# Patient Record
Sex: MALE | Race: WHITE | NOT HISPANIC OR LATINO | Employment: PART TIME | URBAN - METROPOLITAN AREA
[De-identification: names, ages, dates, MRNs, and addresses within clinical notes are randomized per-mention and may not be internally consistent; named-entity substitution may affect disease eponyms.]

---

## 2017-01-19 ENCOUNTER — OFFICE VISIT (OUTPATIENT)
Dept: CARDIOLOGY | Facility: MEDICAL CENTER | Age: 77
End: 2017-01-19
Payer: MEDICARE

## 2017-01-19 VITALS
HEART RATE: 76 BPM | WEIGHT: 243 LBS | DIASTOLIC BLOOD PRESSURE: 70 MMHG | SYSTOLIC BLOOD PRESSURE: 108 MMHG | BODY MASS INDEX: 44.72 KG/M2 | OXYGEN SATURATION: 93 % | HEIGHT: 62 IN

## 2017-01-19 DIAGNOSIS — E78.2 MIXED HYPERLIPIDEMIA: ICD-10-CM

## 2017-01-19 DIAGNOSIS — I10 ESSENTIAL HYPERTENSION, BENIGN: ICD-10-CM

## 2017-01-19 DIAGNOSIS — M19.90 ARTHRITIS: ICD-10-CM

## 2017-01-19 DIAGNOSIS — N40.1 BENIGN NON-NODULAR PROSTATIC HYPERPLASIA WITH LOWER URINARY TRACT SYMPTOMS: ICD-10-CM

## 2017-01-19 PROCEDURE — 99213 OFFICE O/P EST LOW 20 MIN: CPT | Performed by: INTERNAL MEDICINE

## 2017-01-19 RX ORDER — AMLODIPINE BESYLATE 5 MG/1
5 TABLET ORAL DAILY
Qty: 90 TAB | Refills: 3 | Status: SHIPPED | OUTPATIENT
Start: 2017-01-19 | End: 2017-08-31

## 2017-01-19 RX ORDER — TADALAFIL 5 MG/1
5 TABLET ORAL PRN
Qty: 90 TAB | Refills: 3 | Status: SHIPPED | OUTPATIENT
Start: 2017-01-19

## 2017-01-19 RX ORDER — VALSARTAN AND HYDROCHLOROTHIAZIDE 320; 25 MG/1; MG/1
1 TABLET, FILM COATED ORAL DAILY
Qty: 90 TAB | Refills: 3 | Status: SHIPPED | OUTPATIENT
Start: 2017-01-19 | End: 2017-08-31

## 2017-01-19 RX ORDER — METOPROLOL SUCCINATE 50 MG/1
50 TABLET, EXTENDED RELEASE ORAL DAILY
Qty: 90 TAB | Refills: 3 | Status: SHIPPED | OUTPATIENT
Start: 2017-01-19 | End: 2017-11-06 | Stop reason: SDUPTHER

## 2017-01-19 ASSESSMENT — ENCOUNTER SYMPTOMS
PND: 0
ORTHOPNEA: 0
MYALGIAS: 0
FALLS: 0
WHEEZING: 0
COUGH: 0

## 2017-01-19 ASSESSMENT — LIFESTYLE VARIABLES: SUBSTANCE_ABUSE: 0

## 2017-01-19 NOTE — Clinical Note
[facility]   [address]  [place] [address]  Phone: [phone]  Fax: [phone]              Kendrick Schilling  1940    Encounter Date: 1/19/2017    Levy Martinez M.D.          PROGRESS NOTE:  Subjective:   Kendrick Schilling is a 76 y.o. male who presents today for follow-up of hypertension and hyperlipidemia. His right hip had to be injected last week but otherwise he's had no symptoms and has had actually no chest discomfort or shortness of breath or arrhythmia.    Past Medical History   Diagnosis Date   • Hyperlipidemia 2/16/2010   • DM (diabetes mellitus), type 2, uncontrolled 2/16/2010   • Hypertension    • Vascular calcification 2/11/2016     Noted incidentally on orthopedic plane films of the hips    • Sleep apnea, obstructive 2/11/2016     Uses CPAP and is regularly followed by Dr. Palmer; unable to estimate right ventricular systolic pressure on echo 2016 because of absent tricuspid regurgitation but no indirect evidence of pulmonary hypertension   • Arthritis of right hip      Past Surgical History   Procedure Laterality Date   • Knee replacement, total Bilateral    • Cataract extraction with iol Bilateral    • Urology surgery       cystocele repair   • Tonsillectomy       Family History   Problem Relation Age of Onset   • Dementia Mother 79   • Heart Disease Father    • Heart Attack Father 69   • Cancer Brother 67     lymphoma   • Dementia Brother      History   Smoking status   • Never Smoker    Smokeless tobacco   • Never Used     Allergies   Allergen Reactions   • Vicodin [Hydrocodone-Acetaminophen]      Outpatient Encounter Prescriptions as of 1/19/2017   Medication Sig Dispense Refill   • tadalafil (CIALIS) 5 MG tablet Take 1 Tab by mouth as needed. 90 Tab 3   • amlodipine (NORVASC) 5 MG Tab Take 1 Tab by mouth every day. 90 Tab 3   • metoprolol SR (TOPROL XL) 50 MG TABLET SR 24 HR Take 1 Tab by mouth every day. 90  Tab 3   • valsartan-hydrochlorothiazide (DIOVAN-HCT) 320-25 MG per tablet Take 1 Tab by mouth every day. 90 Tab 3   • ezetimibe-simvastatin (VYTORIN) 10-40 MG per tablet Take 1 Tab by mouth every day. 90 Tab 3   • Multiple Vitamins-Minerals (OCUVITE) Tab Take 1 Tab by mouth every day.     • Coenzyme Q10 (CO Q 10 PO) Take  by mouth.     • Plant Sterols and Stanols (CHOLEST OFF PO) Take  by mouth.     • furosemide (LASIX) 20 MG TABS Take 2 at noon 120 Tab 0   • Diclofenac Sodium 1 % GEL Apply 1 Application to skin as directed 2 Times a Day. 1 Tube 2   • metformin (GLUCOPHAGE) 500 MG TABS TAKE 1 TABLET BY MOUTH EVERY MORNING 90 Tab 0   • aspirin EC (ECOTRIN) 81 MG TBEC Take 81 mg by mouth every day.     • docosahexanoic acid (OMEGA 3 FA) 1000 MG CAPS Take 1,000 mg by mouth 2 Times a Day.     • glucosamine Sulfate 500 MG CAPS Take 500 mg by mouth 2 times a day, with meals.     • cyanocobalamin (VITAMIN B-12) 100 MCG TABS Take 100 mcg by mouth every day.     • cholecalciferol (D-3-5) 5000 UNIT CAPS Take 5,000 Units by mouth every day.     • niacin SR (NIASPAN) 500 MG TBCR Take 500 mg by mouth 2 times a day.     • [DISCONTINUED] amlodipine (NORVASC) 5 MG Tab Take 1 Tab by mouth every day. 90 Tab 3   • [DISCONTINUED] metoprolol SR (TOPROL XL) 50 MG TABLET SR 24 HR Take 1 Tab by mouth every day. 90 Tab 3   • [DISCONTINUED] tadalafil (CIALIS) 5 MG tablet Take 1 Tab by mouth as needed. 90 Tab 3   • gabapentin (NEURONTIN) 300 MG Cap      • [DISCONTINUED] valsartan-hydrochlorothiazide (DIOVAN-HCT) 320-25 MG per tablet Take 1 Tab by mouth every day. **ALLOW 24- 48HRS FOR REFILLS** 90 Tab 0   • naproxen (NAPROSYN) 500 MG TABS Take 1 Tab by mouth 2 times a day, with meals. 60 Tab 3     No facility-administered encounter medications on file as of 1/19/2017.     Review of Systems   Respiratory: Negative for cough and wheezing.    Cardiovascular: Negative for orthopnea and PND.   Musculoskeletal: Negative for myalgias and falls.    "  Psychiatric/Behavioral: Negative for substance abuse.        Objective:   /70 mmHg  Pulse 76  Ht 1.575 m (5' 2.01\")  Wt 110.224 kg (243 lb)  BMI 44.43 kg/m2  SpO2 93%    Physical Exam   Constitutional: He is oriented to person, place, and time. He appears well-nourished. No distress.   Eyes: Conjunctivae are normal. No scleral icterus.   Neck: No JVD present.   Cardiovascular: Normal rate, regular rhythm, normal heart sounds and intact distal pulses.  Exam reveals no gallop.    No murmur heard.  Pulmonary/Chest: Effort normal and breath sounds normal.   Musculoskeletal: He exhibits no edema.   Neurological: He is alert and oriented to person, place, and time.   Skin: Skin is warm and dry. He is not diaphoretic.   Psychiatric: He has a normal mood and affect. Thought content normal.       Assessment:     1. Essential hypertension, benign  COMP METABOLIC PANEL    CBC WITH DIFFERENTIAL    amlodipine (NORVASC) 5 MG Tab    metoprolol SR (TOPROL XL) 50 MG TABLET SR 24 HR    valsartan-hydrochlorothiazide (DIOVAN-HCT) 320-25 MG per tablet   2. Mixed hyperlipidemia  LIPID PROFILE    TSH   3. Benign non-nodular prostatic hyperplasia with lower urinary tract symptoms  tadalafil (CIALIS) 5 MG tablet   4. Arthritis       The above assessed cardiovascular problems are clinically stable.  He continues urology follow-up with Dr. Rosario and physiatry follow-up as well.  Medical Decision Making:  Today's Assessment / Status / Plan:   Continue the current cardiovascular regimen.  Continue primary follow up with  Dr. Rose.   Cardiology follow up in  6 month(s) and  sooner if needed for any change.   Lab now and call.  Use of the emergency medical system reviewed.         No Recipients                "

## 2017-01-19 NOTE — MR AVS SNAPSHOT
"        Kendrick Gandhion   2017 8:20 AM   Office Visit   MRN: [id number]    Department:  [facility]   Dept Phone:  [phone]    Description:  Male : 1940   Provider:  Levy Martinez M.D.           Allergies as of 2017     Allergen Noted Reactions    Vicodin [Hydrocodone-Acetaminophen] 2010         You were diagnosed with     Essential hypertension, benign   [401.1.ICD-9-CM]       Mixed hyperlipidemia   [272.2.ICD-9-CM]       Benign non-nodular prostatic hyperplasia with lower urinary tract symptoms   [5774477]       Arthritis   [555446]         Vital Signs     Blood Pressure Pulse Height Weight Body Mass Index Oxygen Saturation    108/70 mmHg 76 1.575 m (5' 2.01\") 110.224 kg (243 lb) 44.43 kg/m2 93%    Smoking Status                   Never Smoker            Basic Information     Date Of Birth Sex Race Ethnicity Preferred Language    1940 Male White Non- English      Your appointments     2017  9:00 AM   FOLLOW UP with Levy Martinez M.D.   [facility]    [address]  [facility] [id number] [phone]              Problem List              ICD-10-CM Priority Class Noted - Resolved    DM (diabetes mellitus), type 2 (CMS-HCC) E11.9 Medium Chronic 2010 - Present    Malignant melanoma of right lower extremity (CMS-HCC) C43.71 High  11/15/2011 - Present    Hydrocele, left N43.3 Low  10/21/2013 - Present    Obesity, Class II, BMI 35-39.9 (CMS-HCC) E66.01 High Chronic 2014 - Present    Skin lesion of left leg L98.9 Medium Present on Arrival 2014 - Present    Dependent edema R60.9 High Chronic 2015 - Present    BPH associated with nocturia N40.1, R35.1 Medium Chronic 1/15/2015 - Present    Vascular calcification I99.8   2016 - Present    Sleep apnea, obstructive G47.33   2016 - Present    Mixed hyperlipidemia E78.2   2016 - Present    Essential " hypertension, benign I10   5/5/2016 - Present    Arthritis M19.90   1/19/2017 - Present    Benign prostatic hyperplasia with lower urinary tract symptoms N40.1   1/19/2017 - Present      Health Maintenance        Date Due Completion Dates    IMM DTaP/Tdap/Td Vaccine (1 - Tdap) 11/24/1958 ---    A1C SCREENING 3/19/2015 9/19/2014, 5/9/2014 (Done), 10/21/2013, 6/15/2012, 1/14/2011, 2/8/2010    Override on 5/9/2014: Done    IMM PNEUMOCOCCAL 65+ (ADULT) LOW/MEDIUM RISK SERIES (2 of 2 - PCV13) 5/9/2015 5/9/2014    RETINAL SCREENING 8/1/2015 8/1/2014 (Prv Comp)    Override on 8/1/2014: Previously completed    URINE ACR / MICROALBUMIN 9/9/2015 9/9/2014 (Prv Comp), 5/13/2011, 6/11/2010    Override on 9/9/2014: Previously completed    DIABETES MONOFILAMENT / LE EXAM 9/19/2015 9/19/2014 (Done)    Override on 9/19/2014: Done    IMM INFLUENZA (1) 9/1/2016 10/2/2014    FASTING LIPID PROFILE 5/26/2017 5/26/2016, 9/9/2014 (Prv Comp), 6/15/2012, 5/11/2012, 1/14/2011, 6/11/2010, 2/8/2010    Override on 9/9/2014: Previously completed    SERUM CREATININE 5/26/2017 5/26/2016, 9/9/2014 (Prv Comp), 6/15/2012, 6/15/2012, 2/8/2010    Override on 9/9/2014: Previously completed    COLONOSCOPY 1/1/2019 1/1/2009 (Prv Comp)    Override on 1/1/2009: Previously completed (patient stated)            Current Immunizations     Influenza Vaccine Quad Inj (Preserved) 10/2/2014  3:00 PM    Pneumococcal polysaccharide vaccine (PPSV-23) 5/9/2014  3:30 PM    SHINGLES VACCINE 10/2/2014  3:00 AM      Below and/or attached are the medications your provider expects you to take. Review all of your home medications and newly ordered medications with your provider and/or pharmacist. Follow medication instructions as directed by your provider and/or pharmacist. Please keep your medication list with you and share with your provider. Update the information when medications are discontinued, doses are changed, or new medications (including over-the-counter products)  are added; and carry medication information at all times in the event of emergency situations     Allergies:  VICODIN - (reactions not documented)               Medications  Valid as of: January 19, 2017 -  8:43 AM    Generic Name Brand Name Tablet Size Instructions for use    AmLODIPine Besylate (Tab) NORVASC 5 MG Take 1 Tab by mouth every day.        Aspirin (Tablet Delayed Response) ECOTRIN 81 MG Take 81 mg by mouth every day.        Cholecalciferol (Cap) VITAMIN D3 5000 UNIT Take 5,000 Units by mouth every day.        Coenzyme Q10   Take  by mouth.        Cyanocobalamin (Tab) VITAMIN B-12 100 MCG Take 100 mcg by mouth every day.        Diclofenac Sodium (Gel) Diclofenac Sodium 1 % Apply 1 Application to skin as directed 2 Times a Day.        Ezetimibe-Simvastatin (Tab) VYTORIN 10-40 MG Take 1 Tab by mouth every day.        Furosemide (Tab) LASIX 20 MG Take 2 at noon        Gabapentin (Cap) NEURONTIN 300 MG         Glucosamine Sulfate (Cap) glucosamine Sulfate 500 MG Take 500 mg by mouth 2 times a day, with meals.        MetFORMIN HCl (Tab) GLUCOPHAGE 500 MG TAKE 1 TABLET BY MOUTH EVERY MORNING        Metoprolol Succinate (TABLET SR 24 HR) TOPROL XL 50 MG Take 1 Tab by mouth every day.        Multiple Vitamins-Minerals (Tab) OCUVITE  Take 1 Tab by mouth every day.        Naproxen (Tab) NAPROSYN 500 MG Take 1 Tab by mouth 2 times a day, with meals.        Niacin (Antihyperlipidemic) (Tab CR) NIASPAN 500 MG Take 500 mg by mouth 2 times a day.        Omega-3 Fatty Acids (Cap) OMEGA 3 FA 1000 MG Take 1,000 mg by mouth 2 Times a Day.        Plant Sterols and Stanols   Take  by mouth.        Tadalafil (Tab) CIALIS 5 MG Take 1 Tab by mouth as needed.        Valsartan-Hydrochlorothiazide (Tab) DIOVAN--25 MG Take 1 Tab by mouth every day.        .                 Medicines prescribed today were sent to:     [facility] [place] [facility] [place] - [address]    [address] [place] [address]    Phone: [phone]  Fax: [phone]    Open 24 Hours?: No      Medication refill instructions:       If your prescription bottle indicates you have medication refills left, it is not necessary to call your provider’s office. Please contact your pharmacy and they will refill your medication.    If your prescription bottle indicates you do not have any refills left, you may request refills at any time through one of the following ways: The online [employer] system (except Urgent Care), by calling your provider’s office, or by asking your pharmacy to contact your provider’s office with a refill request. Medication refills are processed only during regular business hours and may not be available until the next business day. Your provider may request additional information or to have a follow-up visit with you prior to refilling your medication.   *Please Note: Medication refills are assigned a new Rx number when refilled electronically. Your pharmacy may indicate that no refills were authorized even though a new prescription for the same medication is available at the pharmacy. Please request the medicine by name with the pharmacy before contacting your provider for a refill.        Your To Do List     Future Labs/Procedures Complete By Expires    CBC WITH DIFFERENTIAL  As directed 1/19/2018    COMP METABOLIC PANEL  As directed 1/19/2018    LIPID PROFILE  As directed 1/19/2018    TSH  As directed 1/19/2018         [employer] Access Code: [email or web address]  Expires: 2/18/2017  8:43 AM    [employer]  A secure, online tool to manage your health information     [email or web address]’s [employer]® is a secure, online tool that connects you to your personalized health information from the privacy of your home -- day or night - making it very easy for you to manage your healthcare. Once the activation process is completed, you can even access your medical information using the [employer] lasha, which is available for free in the Apple Lasha store or Google Play store.      [employer] provides the following levels of access (as shown below):   My Chart Features   [facility] Primary [facility] Doctor [facility]  Specialists [facility]  Urgent  Care Non-[facility]  Primary Care  Doctor   Email your healthcare team securely and privately 24/7 X X X    Manage appointments: schedule your next appointment; view details of past/upcoming appointments X      Request prescription refills. X      View recent personal medical records, including lab and immunizations X X X X   View health record, including health history, allergies, medications X X X X   Read reports about your outpatient visits, procedures, consult and ER notes X X X X   See your discharge summary, which is a recap of your hospital and/or ER visit that includes your diagnosis, lab results, and care plan. X X       How to register for [employer]:  1. Go to  [email or web address].  2. Click on the Sign Up Now box, which takes you to the New Member Sign Up page. You will need to provide the following information:  a. Enter your [employer] Access Code exactly as it appears at the top of this page. (You will not need to use this code after you’ve completed the sign-up process. If you do not sign up before the expiration date, you must request a new code.)   b. Enter your date of birth.   c. Enter your home email address.   d. Click Submit, and follow the next screen’s instructions.  3. Create a [employer] ID. This will be your [employer] login ID and cannot be changed, so think of one that is secure and easy to remember.  4. Create a [employer] password. You can change your password at any time.  5. Enter your Password Reset Question and Answer. This can be used at a later time if you forget your password.   6. Enter your e-mail address. This allows you to receive e-mail notifications when new information is available in [employer].  7. Click Sign Up. You can now view your health information.    For assistance activating your [employer] account, call [phone]

## 2017-01-19 NOTE — PROGRESS NOTES
Subjective:   Kendrick Schilling is a 76 y.o. male who presents today for follow-up of hypertension and hyperlipidemia. His right hip had to be injected last week but otherwise he's had no symptoms and has had actually no chest discomfort or shortness of breath or arrhythmia.    Past Medical History   Diagnosis Date   • Hyperlipidemia 2/16/2010   • DM (diabetes mellitus), type 2, uncontrolled 2/16/2010   • Hypertension    • Vascular calcification 2/11/2016     Noted incidentally on orthopedic plane films of the hips    • Sleep apnea, obstructive 2/11/2016     Uses CPAP and is regularly followed by Dr. Palmer; unable to estimate right ventricular systolic pressure on echo 2016 because of absent tricuspid regurgitation but no indirect evidence of pulmonary hypertension   • Arthritis of right hip      Past Surgical History   Procedure Laterality Date   • Knee replacement, total Bilateral    • Cataract extraction with iol Bilateral    • Urology surgery       cystocele repair   • Tonsillectomy       Family History   Problem Relation Age of Onset   • Dementia Mother 79   • Heart Disease Father    • Heart Attack Father 69   • Cancer Brother 67     lymphoma   • Dementia Brother      History   Smoking status   • Never Smoker    Smokeless tobacco   • Never Used     Allergies   Allergen Reactions   • Vicodin [Hydrocodone-Acetaminophen]      Outpatient Encounter Prescriptions as of 1/19/2017   Medication Sig Dispense Refill   • tadalafil (CIALIS) 5 MG tablet Take 1 Tab by mouth as needed. 90 Tab 3   • amlodipine (NORVASC) 5 MG Tab Take 1 Tab by mouth every day. 90 Tab 3   • metoprolol SR (TOPROL XL) 50 MG TABLET SR 24 HR Take 1 Tab by mouth every day. 90 Tab 3   • valsartan-hydrochlorothiazide (DIOVAN-HCT) 320-25 MG per tablet Take 1 Tab by mouth every day. 90 Tab 3   • ezetimibe-simvastatin (VYTORIN) 10-40 MG per tablet Take 1 Tab by mouth every day. 90 Tab 3   • Multiple Vitamins-Minerals (OCUVITE) Tab Take 1 Tab by mouth every  "day.     • Coenzyme Q10 (CO Q 10 PO) Take  by mouth.     • Plant Sterols and Stanols (CHOLEST OFF PO) Take  by mouth.     • furosemide (LASIX) 20 MG TABS Take 2 at noon 120 Tab 0   • Diclofenac Sodium 1 % GEL Apply 1 Application to skin as directed 2 Times a Day. 1 Tube 2   • metformin (GLUCOPHAGE) 500 MG TABS TAKE 1 TABLET BY MOUTH EVERY MORNING 90 Tab 0   • aspirin EC (ECOTRIN) 81 MG TBEC Take 81 mg by mouth every day.     • docosahexanoic acid (OMEGA 3 FA) 1000 MG CAPS Take 1,000 mg by mouth 2 Times a Day.     • glucosamine Sulfate 500 MG CAPS Take 500 mg by mouth 2 times a day, with meals.     • cyanocobalamin (VITAMIN B-12) 100 MCG TABS Take 100 mcg by mouth every day.     • cholecalciferol (D-3-5) 5000 UNIT CAPS Take 5,000 Units by mouth every day.     • niacin SR (NIASPAN) 500 MG TBCR Take 500 mg by mouth 2 times a day.     • [DISCONTINUED] amlodipine (NORVASC) 5 MG Tab Take 1 Tab by mouth every day. 90 Tab 3   • [DISCONTINUED] metoprolol SR (TOPROL XL) 50 MG TABLET SR 24 HR Take 1 Tab by mouth every day. 90 Tab 3   • [DISCONTINUED] tadalafil (CIALIS) 5 MG tablet Take 1 Tab by mouth as needed. 90 Tab 3   • gabapentin (NEURONTIN) 300 MG Cap      • [DISCONTINUED] valsartan-hydrochlorothiazide (DIOVAN-HCT) 320-25 MG per tablet Take 1 Tab by mouth every day. **ALLOW 24- 48HRS FOR REFILLS** 90 Tab 0   • naproxen (NAPROSYN) 500 MG TABS Take 1 Tab by mouth 2 times a day, with meals. 60 Tab 3     No facility-administered encounter medications on file as of 1/19/2017.     Review of Systems   Respiratory: Negative for cough and wheezing.    Cardiovascular: Negative for orthopnea and PND.   Musculoskeletal: Negative for myalgias and falls.   Psychiatric/Behavioral: Negative for substance abuse.        Objective:   /70 mmHg  Pulse 76  Ht 1.575 m (5' 2.01\")  Wt 110.224 kg (243 lb)  BMI 44.43 kg/m2  SpO2 93%    Physical Exam   Constitutional: He is oriented to person, place, and time. He appears well-nourished. " No distress.   Eyes: Conjunctivae are normal. No scleral icterus.   Neck: No JVD present.   Cardiovascular: Normal rate, regular rhythm, normal heart sounds and intact distal pulses.  Exam reveals no gallop.    No murmur heard.  Pulmonary/Chest: Effort normal and breath sounds normal.   Musculoskeletal: He exhibits no edema.   Neurological: He is alert and oriented to person, place, and time.   Skin: Skin is warm and dry. He is not diaphoretic.   Psychiatric: He has a normal mood and affect. Thought content normal.       Assessment:     1. Essential hypertension, benign  COMP METABOLIC PANEL    CBC WITH DIFFERENTIAL    amlodipine (NORVASC) 5 MG Tab    metoprolol SR (TOPROL XL) 50 MG TABLET SR 24 HR    valsartan-hydrochlorothiazide (DIOVAN-HCT) 320-25 MG per tablet   2. Mixed hyperlipidemia  LIPID PROFILE    TSH   3. Benign non-nodular prostatic hyperplasia with lower urinary tract symptoms  tadalafil (CIALIS) 5 MG tablet   4. Arthritis       The above assessed cardiovascular problems are clinically stable.  He continues urology follow-up with Dr. Rosario and physiatry follow-up as well.  Medical Decision Making:  Today's Assessment / Status / Plan:   Continue the current cardiovascular regimen.  Continue primary follow up with  Dr. Rose.   Cardiology follow up in  6 month(s) and  sooner if needed for any change.   Lab now and call.  Use of the emergency medical system reviewed.

## 2017-03-07 LAB
ALBUMIN SERPL-MCNC: 3.8 G/DL (ref 3.5–4.8)
ALBUMIN/GLOB SERPL: 1.7 {RATIO} (ref 1.1–2.5)
ALP SERPL-CCNC: 61 IU/L (ref 39–117)
ALT SERPL-CCNC: 21 IU/L (ref 0–44)
AST SERPL-CCNC: 21 IU/L (ref 0–40)
BASOPHILS # BLD AUTO: 0 X10E3/UL (ref 0–0.2)
BASOPHILS NFR BLD AUTO: 0 %
BILIRUB SERPL-MCNC: 0.3 MG/DL (ref 0–1.2)
BUN SERPL-MCNC: 15 MG/DL (ref 8–27)
BUN/CREAT SERPL: 19 (ref 10–22)
CALCIUM SERPL-MCNC: 9 MG/DL (ref 8.6–10.2)
CHLORIDE SERPL-SCNC: 103 MMOL/L (ref 96–106)
CHOLEST SERPL-MCNC: 157 MG/DL (ref 100–199)
CO2 SERPL-SCNC: 25 MMOL/L (ref 18–29)
COMMENT 011824: NORMAL
CREAT SERPL-MCNC: 0.81 MG/DL (ref 0.76–1.27)
EOSINOPHIL # BLD AUTO: 0.3 X10E3/UL (ref 0–0.4)
EOSINOPHIL NFR BLD AUTO: 4 %
ERYTHROCYTE [DISTWIDTH] IN BLOOD BY AUTOMATED COUNT: 13.9 % (ref 12.3–15.4)
GLOBULIN SER CALC-MCNC: 2.3 G/DL (ref 1.5–4.5)
GLUCOSE SERPL-MCNC: 125 MG/DL (ref 65–99)
HCT VFR BLD AUTO: 46.1 % (ref 37.5–51)
HDLC SERPL-MCNC: 61 MG/DL
HGB BLD-MCNC: 14.8 G/DL (ref 12.6–17.7)
IMM GRANULOCYTES # BLD: 0 X10E3/UL (ref 0–0.1)
IMM GRANULOCYTES NFR BLD: 0 %
IMMATURE CELLS  115398: NORMAL
LDLC SERPL CALC-MCNC: 81 MG/DL (ref 0–99)
LYMPHOCYTES # BLD AUTO: 1.9 X10E3/UL (ref 0.7–3.1)
LYMPHOCYTES NFR BLD AUTO: 25 %
MCH RBC QN AUTO: 30.5 PG (ref 26.6–33)
MCHC RBC AUTO-ENTMCNC: 32.1 G/DL (ref 31.5–35.7)
MCV RBC AUTO: 95 FL (ref 79–97)
MONOCYTES # BLD AUTO: 0.8 X10E3/UL (ref 0.1–0.9)
MONOCYTES NFR BLD AUTO: 10 %
MORPHOLOGY BLD-IMP: NORMAL
NEUTROPHILS # BLD AUTO: 4.5 X10E3/UL (ref 1.4–7)
NEUTROPHILS NFR BLD AUTO: 61 %
NRBC BLD AUTO-RTO: NORMAL %
PLATELET # BLD AUTO: 199 X10E3/UL (ref 150–379)
POTASSIUM SERPL-SCNC: 4.1 MMOL/L (ref 3.5–5.2)
PROT SERPL-MCNC: 6.1 G/DL (ref 6–8.5)
RBC # BLD AUTO: 4.85 X10E6/UL (ref 4.14–5.8)
SODIUM SERPL-SCNC: 143 MMOL/L (ref 134–144)
TRIGL SERPL-MCNC: 75 MG/DL (ref 0–149)
TSH SERPL DL<=0.005 MIU/L-ACNC: 1.41 UIU/ML (ref 0.45–4.5)
VLDLC SERPL CALC-MCNC: 15 MG/DL (ref 5–40)
WBC # BLD AUTO: 7.4 X10E3/UL (ref 3.4–10.8)

## 2017-03-28 ENCOUNTER — RX ONLY (OUTPATIENT)
Age: 77
Setting detail: RX ONLY
End: 2017-03-28

## 2017-03-28 PROBLEM — E11.9 TYPE 2 DIABETES MELLITUS WITHOUT COMPLICATIONS: Status: ACTIVE | Noted: 2017-03-28

## 2017-03-28 PROBLEM — Z85.820 PERSONAL HISTORY OF MALIGNANT MELANOMA OF SKIN: Status: ACTIVE | Noted: 2017-03-28

## 2017-07-18 ENCOUNTER — HOSPITAL ENCOUNTER (OUTPATIENT)
Dept: HOSPITAL 8 - STAR | Age: 77
Discharge: HOME | End: 2017-07-18
Attending: ORTHOPAEDIC SURGERY
Payer: MEDICARE

## 2017-07-18 DIAGNOSIS — Z01.818: Primary | ICD-10-CM

## 2017-07-18 DIAGNOSIS — R82.99: ICD-10-CM

## 2017-07-18 DIAGNOSIS — M16.11: ICD-10-CM

## 2017-07-18 LAB
AST SERPL-CCNC: 20 U/L (ref 15–37)
BUN SERPL-MCNC: 32 MG/DL (ref 7–18)
PATH.CAST-FLAG: (no result)
SPERM-FLAG: (no result)
SRC-FLAG: (no result)
XTAL-FLAG: (no result)
YLC-FLAG: (no result)

## 2017-07-18 PROCEDURE — 85025 COMPLETE CBC W/AUTO DIFF WBC: CPT

## 2017-07-18 PROCEDURE — 87081 CULTURE SCREEN ONLY: CPT

## 2017-07-18 PROCEDURE — 87086 URINE CULTURE/COLONY COUNT: CPT

## 2017-07-18 PROCEDURE — 80053 COMPREHEN METABOLIC PANEL: CPT

## 2017-07-18 PROCEDURE — 81001 URINALYSIS AUTO W/SCOPE: CPT

## 2017-07-18 PROCEDURE — 93005 ELECTROCARDIOGRAM TRACING: CPT

## 2017-07-18 PROCEDURE — 36415 COLL VENOUS BLD VENIPUNCTURE: CPT

## 2017-07-20 ENCOUNTER — OFFICE VISIT (OUTPATIENT)
Dept: CARDIOLOGY | Facility: MEDICAL CENTER | Age: 77
End: 2017-07-20
Payer: MEDICARE

## 2017-07-20 VITALS
DIASTOLIC BLOOD PRESSURE: 50 MMHG | HEART RATE: 77 BPM | HEIGHT: 65 IN | SYSTOLIC BLOOD PRESSURE: 100 MMHG | WEIGHT: 241 LBS | BODY MASS INDEX: 40.15 KG/M2 | OXYGEN SATURATION: 91 %

## 2017-07-20 DIAGNOSIS — I10 ESSENTIAL HYPERTENSION, BENIGN: ICD-10-CM

## 2017-07-20 DIAGNOSIS — E78.2 MIXED HYPERLIPIDEMIA: ICD-10-CM

## 2017-07-20 PROCEDURE — 99213 OFFICE O/P EST LOW 20 MIN: CPT | Performed by: INTERNAL MEDICINE

## 2017-07-20 RX ORDER — OMEPRAZOLE 20 MG/1
CAPSULE, DELAYED RELEASE ORAL
COMMUNITY
Start: 2017-05-10

## 2017-07-20 RX ORDER — FINASTERIDE 5 MG/1
TABLET, FILM COATED ORAL
COMMUNITY
Start: 2017-05-10

## 2017-07-20 RX ORDER — SIMVASTATIN 40 MG
TABLET ORAL
COMMUNITY
Start: 2017-05-09 | End: 2018-01-19

## 2017-07-20 RX ORDER — EZETIMIBE 10 MG
TABLET ORAL
COMMUNITY
Start: 2017-05-09

## 2017-07-20 RX ORDER — DULAGLUTIDE 0.75 MG/.5ML
INJECTION, SOLUTION SUBCUTANEOUS
COMMUNITY
Start: 2017-07-13 | End: 2017-11-06 | Stop reason: SDUPTHER

## 2017-07-20 ASSESSMENT — ENCOUNTER SYMPTOMS
FEVER: 0
PND: 0
MYALGIAS: 0
SEIZURES: 0
LOSS OF CONSCIOUSNESS: 0
WHEEZING: 0
CHILLS: 0
COUGH: 0
ORTHOPNEA: 0
FALLS: 0

## 2017-07-20 ASSESSMENT — LIFESTYLE VARIABLES: SUBSTANCE_ABUSE: 0

## 2017-07-20 NOTE — PROGRESS NOTES
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  Cardiac status has been clinically stable since last clinic visit.  No chest pain, palpitations, orthopnea, edema, syncope, or near-syncope.  Exertional capacity has been limited only by his hip which is going to be fixed by Dr. Thomas next week.  No interval change otherwise.    Past Medical History   Diagnosis Date   • Hyperlipidemia 2/16/2010   • DM (diabetes mellitus), type 2, uncontrolled (CMS-HCC) 2/16/2010   • Hypertension    • Vascular calcification 2/11/2016     Noted incidentally on orthopedic plane films of the hips    • Sleep apnea, obstructive 2/11/2016     Uses CPAP and is regularly followed by Dr. Palmer; unable to estimate right ventricular systolic pressure on echo 2016 because of absent tricuspid regurgitation but no indirect evidence of pulmonary hypertension   • Arthritis of right hip      Past Surgical History   Procedure Laterality Date   • Knee replacement, total Bilateral    • Cataract extraction with iol Bilateral    • Urology surgery       cystocele repair   • Tonsillectomy       Family History   Problem Relation Age of Onset   • Dementia Mother 79   • Heart Disease Father    • Heart Attack Father 69   • Cancer Brother 67     lymphoma   • Dementia Brother      History   Smoking status   • Never Smoker    Smokeless tobacco   • Never Used     Allergies   Allergen Reactions   • Vicodin [Hydrocodone-Acetaminophen]      Outpatient Encounter Prescriptions as of 7/20/2017   Medication Sig Dispense Refill   • TRULICITY 0.75 MG/0.5ML Solution Pen-injector      • tadalafil (CIALIS) 5 MG tablet Take 1 Tab by mouth as needed. 90 Tab 3   • amlodipine (NORVASC) 5 MG Tab Take 1 Tab by mouth every day. 90 Tab 3   • metoprolol SR (TOPROL XL) 50 MG TABLET SR 24 HR Take 1 Tab by mouth every day. 90 Tab 3   • valsartan-hydrochlorothiazide (DIOVAN-HCT) 320-25 MG per tablet Take 1 Tab by  "mouth every day. 90 Tab 3   • Coenzyme Q10 (CO Q 10 PO) Take  by mouth.     • furosemide (LASIX) 20 MG TABS Take 2 at noon 120 Tab 0   • Diclofenac Sodium 1 % GEL Apply 1 Application to skin as directed 2 Times a Day. 1 Tube 2   • naproxen (NAPROSYN) 500 MG TABS Take 1 Tab by mouth 2 times a day, with meals. 60 Tab 3   • metformin (GLUCOPHAGE) 500 MG TABS TAKE 1 TABLET BY MOUTH EVERY MORNING 90 Tab 0   • aspirin EC (ECOTRIN) 81 MG TBEC Take 81 mg by mouth every day.     • docosahexanoic acid (OMEGA 3 FA) 1000 MG CAPS Take 1,000 mg by mouth 2 Times a Day.     • glucosamine Sulfate 500 MG CAPS Take 500 mg by mouth 2 times a day, with meals.     • cyanocobalamin (VITAMIN B-12) 100 MCG TABS Take 100 mcg by mouth every day.     • cholecalciferol (D-3-5) 5000 UNIT CAPS Take 5,000 Units by mouth every day.     • niacin SR (NIASPAN) 500 MG TBCR Take 500 mg by mouth 2 times a day.     • omeprazole (PRILOSEC) 20 MG delayed-release capsule      • ZETIA 10 MG Tab      • finasteride (PROSCAR) 5 MG Tab      • simvastatin (ZOCOR) 40 MG Tab      • [DISCONTINUED] ezetimibe-simvastatin (VYTORIN) 10-40 MG per tablet Take 1 Tab by mouth every day. 90 Tab 3   • Multiple Vitamins-Minerals (OCUVITE) Tab Take 1 Tab by mouth every day.     • Plant Sterols and Stanols (CHOLEST OFF PO) Take  by mouth.     • gabapentin (NEURONTIN) 300 MG Cap        No facility-administered encounter medications on file as of 7/20/2017.     Review of Systems   Constitutional: Negative for fever and chills.   HENT: Negative for nosebleeds.    Respiratory: Negative for cough and wheezing.    Cardiovascular: Negative for orthopnea and PND.   Musculoskeletal: Positive for joint pain (hip). Negative for myalgias and falls.   Neurological: Negative for seizures and loss of consciousness.   Psychiatric/Behavioral: Negative for substance abuse.        Objective:   /60 mmHg  Pulse 77  Ht 1.651 m (5' 5\")  Wt 109.317 kg (241 lb)  BMI 40.10 kg/m2  SpO2 " 91%    Physical Exam   Constitutional: He is oriented to person, place, and time. He appears well-nourished. No distress.   Eyes: Conjunctivae are normal. No scleral icterus.   Neck: No JVD present.   Cardiovascular: Normal rate, regular rhythm, normal heart sounds and intact distal pulses.  Exam reveals no gallop.    No murmur heard.  Pulmonary/Chest: Effort normal and breath sounds normal.   Musculoskeletal: He exhibits no edema.   Neurological: He is alert and oriented to person, place, and time.   Skin: Skin is warm and dry. He is not diaphoretic.   Psychiatric: He has a normal mood and affect. Thought content normal.     Lab earlier this spring were good and glycemic control has been satisfactory as directed by Dr. Trejo.    Assessment:     1. Essential hypertension, benign     2. Mixed hyperlipidemia     The above assessed cardiovascular problems are clinically stable.  There are no evident cardiac contraindications to the proposed hip surgery. He is getting his preoperative testing at [facility] and I am requesting that.    Medical Decision Making:  Today's Assessment / Status / Plan:     I made no change in his regimen.  Assuming he has no abnormalities in his preop testing he should be cleared for surgery.  I did recommend that he hold his amlodipine on the morning of surgery because his blood pressure may be borderline over controlled and following surgery I would like to see him during his early rehab to monitor his blood pressure.

## 2017-07-20 NOTE — PROGRESS NOTES
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  Cardiac status has been clinically stable since last clinic visit.  No chest pain, palpitations, orthopnea, edema, syncope, or near-syncope.  Exertional capacity has been limited only by his hip which is going to be fixed by Dr. Thomas next week.  No interval change otherwise.    Past Medical History   Diagnosis Date   • Hyperlipidemia 2/16/2010   • DM (diabetes mellitus), type 2, uncontrolled (CMS-HCC) 2/16/2010   • Hypertension    • Vascular calcification 2/11/2016     Noted incidentally on orthopedic plane films of the hips    • Sleep apnea, obstructive 2/11/2016     Uses CPAP and is regularly followed by Dr. Palmer; unable to estimate right ventricular systolic pressure on echo 2016 because of absent tricuspid regurgitation but no indirect evidence of pulmonary hypertension   • Arthritis of right hip      Past Surgical History   Procedure Laterality Date   • Knee replacement, total Bilateral    • Cataract extraction with iol Bilateral    • Urology surgery       cystocele repair   • Tonsillectomy       Family History   Problem Relation Age of Onset   • Dementia Mother 79   • Heart Disease Father    • Heart Attack Father 69   • Cancer Brother 67     lymphoma   • Dementia Brother      History   Smoking status   • Never Smoker    Smokeless tobacco   • Never Used     Allergies   Allergen Reactions   • Vicodin [Hydrocodone-Acetaminophen]      Outpatient Encounter Prescriptions as of 7/20/2017   Medication Sig Dispense Refill   • TRULICITY 0.75 MG/0.5ML Solution Pen-injector      • tadalafil (CIALIS) 5 MG tablet Take 1 Tab by mouth as needed. 90 Tab 3   • amlodipine (NORVASC) 5 MG Tab Take 1 Tab by mouth every day. 90 Tab 3   • metoprolol SR (TOPROL XL) 50 MG TABLET SR 24 HR Take 1 Tab by mouth every day. 90 Tab 3   • valsartan-hydrochlorothiazide (DIOVAN-HCT) 320-25 MG per tablet Take 1 Tab by  "mouth every day. 90 Tab 3   • Coenzyme Q10 (CO Q 10 PO) Take  by mouth.     • furosemide (LASIX) 20 MG TABS Take 2 at noon 120 Tab 0   • Diclofenac Sodium 1 % GEL Apply 1 Application to skin as directed 2 Times a Day. 1 Tube 2   • naproxen (NAPROSYN) 500 MG TABS Take 1 Tab by mouth 2 times a day, with meals. 60 Tab 3   • metformin (GLUCOPHAGE) 500 MG TABS TAKE 1 TABLET BY MOUTH EVERY MORNING 90 Tab 0   • aspirin EC (ECOTRIN) 81 MG TBEC Take 81 mg by mouth every day.     • docosahexanoic acid (OMEGA 3 FA) 1000 MG CAPS Take 1,000 mg by mouth 2 Times a Day.     • glucosamine Sulfate 500 MG CAPS Take 500 mg by mouth 2 times a day, with meals.     • cyanocobalamin (VITAMIN B-12) 100 MCG TABS Take 100 mcg by mouth every day.     • cholecalciferol (D-3-5) 5000 UNIT CAPS Take 5,000 Units by mouth every day.     • niacin SR (NIASPAN) 500 MG TBCR Take 500 mg by mouth 2 times a day.     • omeprazole (PRILOSEC) 20 MG delayed-release capsule      • ZETIA 10 MG Tab      • finasteride (PROSCAR) 5 MG Tab      • simvastatin (ZOCOR) 40 MG Tab      • [DISCONTINUED] ezetimibe-simvastatin (VYTORIN) 10-40 MG per tablet Take 1 Tab by mouth every day. 90 Tab 3   • Multiple Vitamins-Minerals (OCUVITE) Tab Take 1 Tab by mouth every day.     • Plant Sterols and Stanols (CHOLEST OFF PO) Take  by mouth.     • gabapentin (NEURONTIN) 300 MG Cap        No facility-administered encounter medications on file as of 7/20/2017.     Review of Systems   Constitutional: Negative for fever and chills.   HENT: Negative for nosebleeds.    Respiratory: Negative for cough and wheezing.    Cardiovascular: Negative for orthopnea and PND.   Musculoskeletal: Positive for joint pain (hip). Negative for myalgias and falls.   Neurological: Negative for seizures and loss of consciousness.   Psychiatric/Behavioral: Negative for substance abuse.        Objective:   /50 mmHg  Pulse 77  Ht 1.651 m (5' 5\")  Wt 109.317 kg (241 lb)  BMI 40.10 kg/m2  SpO2 " 91%    Physical Exam   Constitutional: He is oriented to person, place, and time. He appears well-nourished. No distress.   Eyes: Conjunctivae are normal. No scleral icterus.   Neck: No JVD present.   Cardiovascular: Normal rate, regular rhythm, normal heart sounds and intact distal pulses.  Exam reveals no gallop.    No murmur heard.  Pulmonary/Chest: Effort normal and breath sounds normal.   Musculoskeletal: He exhibits no edema.   Neurological: He is alert and oriented to person, place, and time.   Skin: Skin is warm and dry. He is not diaphoretic.   Psychiatric: He has a normal mood and affect. Thought content normal.       Assessment:     1. Essential hypertension, benign     2. Mixed hyperlipidemia         Medical Decision Making:  Today's Assessment / Status / Plan:

## 2017-07-20 NOTE — MR AVS SNAPSHOT
"Kendrick Schilling   2017 9:00 AM   Office Visit   MRN: [id number]    Department:  Heart [facility]   Dept Phone:  [phone]    Description:  Male : 1940   Provider:  Levy Martinez M.D.           Reason for Visit     Follow-Up           Allergies as of 2017     Allergen Noted Reactions    Vicodin [Hydrocodone-Acetaminophen] 2010         Vital Signs     Blood Pressure Pulse Height Weight Body Mass Index Oxygen Saturation    100/50 mmHg 77 1.651 m (5' 5\") 109.317 kg (241 lb) 40.10 kg/m2 91%    Smoking Status                   Never Smoker            Basic Information     Date Of Birth Sex Race Ethnicity Preferred Language    1940 Male White Non- English      Your appointments     Aug 31, 2017  9:00 AM   FOLLOW UP with Levy Martinez M.D.   [facility]    [address]  [address]  [facility] [address] [phone]              Problem List              ICD-10-CM Priority Class Noted - Resolved    DM (diabetes mellitus), type 2 (CMS-HCC) E11.9 Medium Chronic 2010 - Present    Malignant melanoma of right lower extremity (CMS-[facility]) C43.71 High  11/15/2011 - Present    Hydrocele, left N43.3 Low  10/21/2013 - Present    Obesity, Class II, BMI 35-39.9 (CMS-[facility]) E66.9 High Chronic 2014 - Present    Skin lesion of left leg L98.9 Medium Present on Arrival 2014 - Present    Dependent edema R60.9 High Chronic 2015 - Present    BPH associated with nocturia N40.1, R35.1 Medium Chronic 1/15/2015 - Present    Vascular calcification I99.8   2016 - Present    Sleep apnea, obstructive G47.33   2016 - Present    Mixed hyperlipidemia E78.2   2016 - Present    Essential hypertension, benign I10   2016 - Present    Arthritis M19.90   2017 - Present    Benign prostatic hyperplasia with lower urinary tract symptoms N40.1   2017 - Present      Health Maintenance        Date " Due Completion Dates    IMM DTaP/Tdap/Td Vaccine (1 - Tdap) [birth year]24/1958 ---    A1C SCREENING 3/19/2015 9/19/2014, 5/9/2014 (Done), 10/21/2013, 6/15/2012, 1/14/2011, 2/8/2010    Override on 5/9/2014: Done    IMM PNEUMOCOCCAL 65+ (ADULT) LOW/MEDIUM RISK SERIES (2 of 2 - PCV13) 5/9/2015 5/9/2014    RETINAL SCREENING 8/1/2015 8/1/2014 (Prv Comp)    Override on 8/1/2014: Previously completed    URINE ACR / MICROALBUMIN 9/9/2015 9/9/2014 (Prv Comp), 5/13/2011, 6/11/2010    Override on 9/9/2014: Previously completed    DIABETES MONOFILAMENT / LE EXAM 9/19/2015 9/19/2014 (Done)    Override on 9/19/2014: Done    IMM INFLUENZA (1) 9/1/2017 10/2/2014    FASTING LIPID PROFILE 3/6/2018 3/6/2017, 5/26/2016, 9/9/2014 (Prv Comp), 6/15/2012, 5/11/2012, 1/14/2011, 6/11/2010, 2/8/2010    Override on 9/9/2014: Previously completed    SERUM CREATININE 3/6/2018 3/6/2017, 5/26/2016, 9/9/2014 (Prv Comp), 6/15/2012, 6/15/2012, 2/8/2010    Override on 9/9/2014: Previously completed    COLONOSCOPY 1/1/2019 1/1/2009 (Prv Comp)    Override on 1/1/2009: Previously completed (patient stated)            Current Immunizations     Influenza Vaccine Quad Inj (Preserved) 10/2/2014  3:00 PM    Pneumococcal polysaccharide vaccine (PPSV-23) 5/9/2014  3:30 PM    SHINGLES VACCINE 10/2/2014  3:00 AM      Below and/or attached are the medications your provider expects you to take. Review all of your home medications and newly ordered medications with your provider and/or pharmacist. Follow medication instructions as directed by your provider and/or pharmacist. Please keep your medication list with you and share with your provider. Update the information when medications are discontinued, doses are changed, or new medications (including over-the-counter products) are added; and carry medication information at all times in the event of emergency situations     Allergies:  VICODIN - (reactions not documented)               Medications  Valid as of: July 20, 2017  -  9:31 AM    Generic Name Brand Name Tablet Size Instructions for use    AmLODIPine Besylate (Tab) NORVASC 5 MG Take 1 Tab by mouth every day.        Aspirin (Tablet Delayed Response) ECOTRIN 81 MG Take 81 mg by mouth every day.        Cholecalciferol (Cap) VITAMIN D3 5000 UNIT Take 5,000 Units by mouth every day.        Coenzyme Q10   Take  by mouth.        Cyanocobalamin (Tab) VITAMIN B-12 100 MCG Take 100 mcg by mouth every day.        Diclofenac Sodium (Gel) Diclofenac Sodium 1 % Apply 1 Application to skin as directed 2 Times a Day.        Dulaglutide (Solution Pen-injector) TRULICITY 0.75 MG/0.5ML         Ezetimibe (Tab) ZETIA 10 MG         Finasteride (Tab) PROSCAR 5 MG         Furosemide (Tab) LASIX 20 MG Take 2 at noon        Gabapentin (Cap) NEURONTIN 300 MG         Glucosamine Sulfate (Cap) glucosamine Sulfate 500 MG Take 500 mg by mouth 2 times a day, with meals.        MetFORMIN HCl (Tab) GLUCOPHAGE 500 MG TAKE 1 TABLET BY MOUTH EVERY MORNING        Metoprolol Succinate (TABLET SR 24 HR) TOPROL XL 50 MG Take 1 Tab by mouth every day.        Multiple Vitamins-Minerals (Tab) OCUVITE  Take 1 Tab by mouth every day.        Naproxen (Tab) NAPROSYN 500 MG Take 1 Tab by mouth 2 times a day, with meals.        Niacin (Antihyperlipidemic) (Tab CR) NIASPAN 500 MG Take 500 mg by mouth 2 times a day.        Omega-3 Fatty Acids (Cap) OMEGA 3 FA 1000 MG Take 1,000 mg by mouth 2 Times a Day.        Omeprazole (CAPSULE DELAYED RELEASE) PRILOSEC 20 MG         Plant Sterols and Stanols   Take  by mouth.        Simvastatin (Tab) ZOCOR 40 MG         Tadalafil (Tab) CIALIS 5 MG Take 1 Tab by mouth as needed.        Valsartan-Hydrochlorothiazide (Tab) DIOVAN--25 MG Take 1 Tab by mouth every day.        .                 Medicines prescribed today were sent to:     [id number] - [place] - [address]    [address] [place] [address]    Phone: [phone] Fax: [phone]    Open 24 Hours?: No         Medication refill instructions:       If your prescription bottle indicates you have medication refills left, it is not necessary to call your provider’s office. Please contact your pharmacy and they will refill your medication.    If your prescription bottle indicates you do not have any refills left, you may request refills at any time through one of the following ways: The online [employer] system (except Urgent Care), by calling your provider’s office, or by asking your pharmacy to contact your provider’s office with a refill request. Medication refills are processed only during regular business hours and may not be available until the next business day. Your provider may request additional information or to have a follow-up visit with you prior to refilling your medication.   *Please Note: Medication refills are assigned a new Rx number when refilled electronically. Your pharmacy may indicate that no refills were authorized even though a new prescription for the same medication is available at the pharmacy. Please request the medicine by name with the pharmacy before contacting your provider for a refill.           [employer] Access Code: [email or web address]  Expires: 8/19/2017  9:31 AM    [employer]  A secure, online tool to manage your health information     [employer]’s [employer]® is a secure, online tool that connects you to your personalized health information from the privacy of your home -- day or night - making it very easy for you to manage your healthcare. Once the activation process is completed, you can even access your medical information using the [employer] lasha, which is available for free in the [employer] Lasha store or Google Play store.     [employer] provides the following levels of access (as shown below):   My Chart Features   [facility] Doctor [facility]  Specialists [facility]  Urgent  Care Non-[facility]  Primary Care  Doctor   Email your healthcare team securely and privately 24/7 X X X    Manage  appointments: schedule your next appointment; view details of past/upcoming appointments X      Request prescription refills. X      View recent personal medical records, including lab and immunizations X X X X   View health record, including health history, allergies, medications X X X X   Read reports about your outpatient visits, procedures, consult and ER notes X X X X   See your discharge summary, which is a recap of your hospital and/or ER visit that includes your diagnosis, lab results, and care plan. X X       How to register for [employer]:  1. Go to  [email or web address].  2. Click on the Sign Up Now box, which takes you to the New Member Sign Up page. You will need to provide the following information:  a. Enter your [employer] Access Code exactly as it appears at the top of this page. (You will not need to use this code after you’ve completed the sign-up process. If you do not sign up before the expiration date, you must request a new code.)   b. Enter your date of birth.   c. Enter your home email address.   d. Click Submit, and follow the next screen’s instructions.  3. Create a [employer] ID. This will be your [employer] login ID and cannot be changed, so think of one that is secure and easy to remember.  4. Create a [employer] password. You can change your password at any time.  5. Enter your Password Reset Question and Answer. This can be used at a later time if you forget your password.   6. Enter your e-mail address. This allows you to receive e-mail notifications when new information is available in [employer].  7. Click Sign Up. You can now view your health information.    For assistance activating your [employer] account, call [phone]

## 2017-07-20 NOTE — Clinical Note
[facility]   [address]   [address]  [place] [address]  Phone: [phone]  Fax: [phone]              Kendrick Schilling  1940    Encounter Date: 7/20/2017    Levy Martinez M.D.          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  Cardiac status has been clinically stable since last clinic visit.  No chest pain, palpitations, orthopnea, edema, syncope, or near-syncope.  Exertional capacity has been limited only by his hip which is going to be fixed by Dr. Thomas next week.  No interval change otherwise.    Past Medical History   Diagnosis Date   • Hyperlipidemia 2/16/2010   • DM (diabetes mellitus), type 2, uncontrolled (CMS-HCC) 2/16/2010   • Hypertension    • Vascular calcification 2/11/2016     Noted incidentally on orthopedic plane films of the hips    • Sleep apnea, obstructive 2/11/2016     Uses CPAP and is regularly followed by Dr. Palmer; unable to estimate right ventricular systolic pressure on echo 2016 because of absent tricuspid regurgitation but no indirect evidence of pulmonary hypertension   • Arthritis of right hip      Past Surgical History   Procedure Laterality Date   • Knee replacement, total Bilateral    • Cataract extraction with iol Bilateral    • Urology surgery       cystocele repair   • Tonsillectomy       Family History   Problem Relation Age of Onset   • Dementia Mother 79   • Heart Disease Father    • Heart Attack Father 69   • Cancer Brother 67     lymphoma   • Dementia Brother      History   Smoking status   • Never Smoker    Smokeless tobacco   • Never Used     Allergies   Allergen Reactions   • Vicodin [Hydrocodone-Acetaminophen]      Outpatient Encounter Prescriptions as of 7/20/2017   Medication Sig Dispense Refill   • TRULICITY 0.75 MG/0.5ML Solution Pen-injector      • tadalafil (CIALIS) 5 MG  tablet Take 1 Tab by mouth as needed. 90 Tab 3   • amlodipine (NORVASC) 5 MG Tab Take 1 Tab by mouth every day. 90 Tab 3   • metoprolol SR (TOPROL XL) 50 MG TABLET SR 24 HR Take 1 Tab by mouth every day. 90 Tab 3   • valsartan-hydrochlorothiazide (DIOVAN-HCT) 320-25 MG per tablet Take 1 Tab by mouth every day. 90 Tab 3   • Coenzyme Q10 (CO Q 10 PO) Take  by mouth.     • furosemide (LASIX) 20 MG TABS Take 2 at noon 120 Tab 0   • Diclofenac Sodium 1 % GEL Apply 1 Application to skin as directed 2 Times a Day. 1 Tube 2   • naproxen (NAPROSYN) 500 MG TABS Take 1 Tab by mouth 2 times a day, with meals. 60 Tab 3   • metformin (GLUCOPHAGE) 500 MG TABS TAKE 1 TABLET BY MOUTH EVERY MORNING 90 Tab 0   • aspirin EC (ECOTRIN) 81 MG TBEC Take 81 mg by mouth every day.     • docosahexanoic acid (OMEGA 3 FA) 1000 MG CAPS Take 1,000 mg by mouth 2 Times a Day.     • glucosamine Sulfate 500 MG CAPS Take 500 mg by mouth 2 times a day, with meals.     • cyanocobalamin (VITAMIN B-12) 100 MCG TABS Take 100 mcg by mouth every day.     • cholecalciferol (D-3-5) 5000 UNIT CAPS Take 5,000 Units by mouth every day.     • niacin SR (NIASPAN) 500 MG TBCR Take 500 mg by mouth 2 times a day.     • omeprazole (PRILOSEC) 20 MG delayed-release capsule      • ZETIA 10 MG Tab      • finasteride (PROSCAR) 5 MG Tab      • simvastatin (ZOCOR) 40 MG Tab      • [DISCONTINUED] ezetimibe-simvastatin (VYTORIN) 10-40 MG per tablet Take 1 Tab by mouth every day. 90 Tab 3   • Multiple Vitamins-Minerals (OCUVITE) Tab Take 1 Tab by mouth every day.     • Plant Sterols and Stanols (CHOLEST OFF PO) Take  by mouth.     • gabapentin (NEURONTIN) 300 MG Cap        No facility-administered encounter medications on file as of 7/20/2017.     Review of Systems   Constitutional: Negative for fever and chills.   HENT: Negative for nosebleeds.    Respiratory: Negative for cough and wheezing.    Cardiovascular: Negative for orthopnea and PND.   Musculoskeletal: Positive for  "joint pain (hip). Negative for myalgias and falls.   Neurological: Negative for seizures and loss of consciousness.   Psychiatric/Behavioral: Negative for substance abuse.        Objective:   /60 mmHg  Pulse 77  Ht 1.651 m (5' 5\")  Wt 109.317 kg (241 lb)  BMI 40.10 kg/m2  SpO2 91%    Physical Exam   Constitutional: He is oriented to person, place, and time. He appears well-nourished. No distress.   Eyes: Conjunctivae are normal. No scleral icterus.   Neck: No JVD present.   Cardiovascular: Normal rate, regular rhythm, normal heart sounds and intact distal pulses.  Exam reveals no gallop.    No murmur heard.  Pulmonary/Chest: Effort normal and breath sounds normal.   Musculoskeletal: He exhibits no edema.   Neurological: He is alert and oriented to person, place, and time.   Skin: Skin is warm and dry. He is not diaphoretic.   Psychiatric: He has a normal mood and affect. Thought content normal.     Lab earlier this spring were good and glycemic control has been satisfactory as directed by Dr. Trejo.    Assessment:     1. Essential hypertension, benign     2. Mixed hyperlipidemia     The above assessed cardiovascular problems are clinically stable.  There are no evident cardiac contraindications to the proposed hip surgery. He is getting his preoperative testing at [place] and I am requesting that.    Medical Decision Making:  Today's Assessment / Status / Plan:     I made no change in his regimen.  Assuming he has no abnormalities in his preop testing he should be cleared for surgery.  I did recommend that he hold his amlodipine on the morning of surgery because his blood pressure may be borderline over controlled and following surgery I would like to see him during his early rehab to monitor his blood pressure.      Cc: Dr. Easton and Dr. Thomas                "

## 2017-07-24 ENCOUNTER — HOSPITAL ENCOUNTER (INPATIENT)
Dept: HOSPITAL 8 - ORIP | Age: 77
LOS: 1 days | Discharge: HOME | DRG: 470 | End: 2017-07-25
Attending: ORTHOPAEDIC SURGERY | Admitting: ORTHOPAEDIC SURGERY
Payer: MEDICARE

## 2017-07-24 VITALS — SYSTOLIC BLOOD PRESSURE: 149 MMHG | DIASTOLIC BLOOD PRESSURE: 86 MMHG

## 2017-07-24 VITALS — HEIGHT: 68 IN | WEIGHT: 238.1 LBS | BODY MASS INDEX: 36.09 KG/M2

## 2017-07-24 VITALS — SYSTOLIC BLOOD PRESSURE: 112 MMHG | DIASTOLIC BLOOD PRESSURE: 70 MMHG

## 2017-07-24 VITALS — SYSTOLIC BLOOD PRESSURE: 109 MMHG | DIASTOLIC BLOOD PRESSURE: 70 MMHG

## 2017-07-24 DIAGNOSIS — M16.11: Primary | ICD-10-CM

## 2017-07-24 DIAGNOSIS — E11.9: ICD-10-CM

## 2017-07-24 DIAGNOSIS — Z88.8: ICD-10-CM

## 2017-07-24 DIAGNOSIS — E78.5: ICD-10-CM

## 2017-07-24 PROCEDURE — 86900 BLOOD TYPING SEROLOGIC ABO: CPT

## 2017-07-24 PROCEDURE — 0SR902Z REPLACEMENT OF RIGHT HIP JOINT WITH METAL ON POLYETHYLENE SYNTHETIC SUBSTITUTE, OPEN APPROACH: ICD-10-PCS | Performed by: ORTHOPAEDIC SURGERY

## 2017-07-24 PROCEDURE — 82962 GLUCOSE BLOOD TEST: CPT

## 2017-07-24 PROCEDURE — C1713 ANCHOR/SCREW BN/BN,TIS/BN: HCPCS

## 2017-07-24 PROCEDURE — 85018 HEMOGLOBIN: CPT

## 2017-07-24 PROCEDURE — 36415 COLL VENOUS BLD VENIPUNCTURE: CPT

## 2017-07-24 PROCEDURE — 85014 HEMATOCRIT: CPT

## 2017-07-24 PROCEDURE — 86850 RBC ANTIBODY SCREEN: CPT

## 2017-07-24 PROCEDURE — C1776 JOINT DEVICE (IMPLANTABLE): HCPCS

## 2017-07-24 RX ADMIN — GABAPENTIN SCH MG: 300 CAPSULE ORAL at 16:00

## 2017-07-24 RX ADMIN — DEXTROSE AND SODIUM CHLORIDE SCH MLS/HR: 5; .45 INJECTION, SOLUTION INTRAVENOUS at 18:43

## 2017-07-24 RX ADMIN — ACETAMINOPHEN SCH MG: 160 SOLUTION ORAL at 15:30

## 2017-07-24 RX ADMIN — ASPIRIN SCH MG: 81 TABLET, COATED ORAL at 18:43

## 2017-07-24 RX ADMIN — CEFAZOLIN SODIUM SCH MLS/HR: 2 SOLUTION INTRAVENOUS at 20:40

## 2017-07-24 RX ADMIN — FENTANYL CITRATE PRN MCG: 50 INJECTION INTRAMUSCULAR; INTRAVENOUS at 15:22

## 2017-07-24 RX ADMIN — ACETAMINOPHEN SCH MG: 160 SOLUTION ORAL at 20:12

## 2017-07-24 RX ADMIN — DOCUSATE SODIUM SCH MG: 100 CAPSULE, LIQUID FILLED ORAL at 20:14

## 2017-07-24 RX ADMIN — TAMSULOSIN HYDROCHLORIDE SCH MG: 0.4 CAPSULE ORAL at 15:35

## 2017-07-24 RX ADMIN — GABAPENTIN SCH MG: 300 CAPSULE ORAL at 20:14

## 2017-07-24 RX ADMIN — FENTANYL CITRATE PRN MCG: 50 INJECTION INTRAMUSCULAR; INTRAVENOUS at 15:29

## 2017-07-25 VITALS — SYSTOLIC BLOOD PRESSURE: 117 MMHG | DIASTOLIC BLOOD PRESSURE: 75 MMHG

## 2017-07-25 VITALS — DIASTOLIC BLOOD PRESSURE: 62 MMHG | SYSTOLIC BLOOD PRESSURE: 128 MMHG

## 2017-07-25 VITALS — DIASTOLIC BLOOD PRESSURE: 70 MMHG | SYSTOLIC BLOOD PRESSURE: 116 MMHG

## 2017-07-25 VITALS — SYSTOLIC BLOOD PRESSURE: 118 MMHG | DIASTOLIC BLOOD PRESSURE: 70 MMHG

## 2017-07-25 RX ADMIN — DOCUSATE SODIUM SCH MG: 100 CAPSULE, LIQUID FILLED ORAL at 10:31

## 2017-07-25 RX ADMIN — ACETAMINOPHEN SCH MG: 160 SOLUTION ORAL at 06:21

## 2017-07-25 RX ADMIN — PREGABALIN SCH MG: 75 CAPSULE ORAL at 01:20

## 2017-07-25 RX ADMIN — ACETAMINOPHEN SCH MG: 160 SOLUTION ORAL at 10:34

## 2017-07-25 RX ADMIN — DEXTROSE AND SODIUM CHLORIDE SCH MLS/HR: 5; .45 INJECTION, SOLUTION INTRAVENOUS at 03:00

## 2017-07-25 RX ADMIN — DEXTROSE AND SODIUM CHLORIDE SCH MLS/HR: 5; .45 INJECTION, SOLUTION INTRAVENOUS at 11:00

## 2017-07-25 RX ADMIN — PREGABALIN SCH MG: 75 CAPSULE ORAL at 10:31

## 2017-07-25 RX ADMIN — ACETAMINOPHEN SCH MG: 160 SOLUTION ORAL at 01:19

## 2017-07-25 RX ADMIN — TAMSULOSIN HYDROCHLORIDE SCH MG: 0.4 CAPSULE ORAL at 10:31

## 2017-07-25 RX ADMIN — CEFAZOLIN SODIUM SCH MLS/HR: 2 SOLUTION INTRAVENOUS at 04:53

## 2017-07-25 RX ADMIN — ASPIRIN SCH MG: 81 TABLET, COATED ORAL at 06:01

## 2017-07-31 ENCOUNTER — HOSPITAL ENCOUNTER (EMERGENCY)
Dept: HOSPITAL 8 - ED | Age: 77
Discharge: HOME | End: 2017-07-31
Payer: MEDICARE

## 2017-07-31 VITALS — DIASTOLIC BLOOD PRESSURE: 59 MMHG | SYSTOLIC BLOOD PRESSURE: 110 MMHG

## 2017-07-31 VITALS — BODY MASS INDEX: 36.39 KG/M2 | HEIGHT: 68 IN | WEIGHT: 240.08 LBS

## 2017-07-31 DIAGNOSIS — R60.0: Primary | ICD-10-CM

## 2017-07-31 DIAGNOSIS — I10: ICD-10-CM

## 2017-07-31 DIAGNOSIS — E11.9: ICD-10-CM

## 2017-07-31 LAB — BUN SERPL-MCNC: 19 MG/DL (ref 7–18)

## 2017-07-31 PROCEDURE — 82040 ASSAY OF SERUM ALBUMIN: CPT

## 2017-07-31 PROCEDURE — 80048 BASIC METABOLIC PNL TOTAL CA: CPT

## 2017-07-31 PROCEDURE — 99285 EMERGENCY DEPT VISIT HI MDM: CPT

## 2017-07-31 PROCEDURE — 93005 ELECTROCARDIOGRAM TRACING: CPT

## 2017-07-31 PROCEDURE — 36415 COLL VENOUS BLD VENIPUNCTURE: CPT

## 2017-07-31 PROCEDURE — 85025 COMPLETE CBC W/AUTO DIFF WBC: CPT

## 2017-08-31 ENCOUNTER — OFFICE VISIT (OUTPATIENT)
Dept: CARDIOLOGY | Facility: MEDICAL CENTER | Age: 77
End: 2017-08-31
Payer: MEDICARE

## 2017-08-31 VITALS
SYSTOLIC BLOOD PRESSURE: 92 MMHG | BODY MASS INDEX: 45.5 KG/M2 | DIASTOLIC BLOOD PRESSURE: 58 MMHG | WEIGHT: 241 LBS | HEIGHT: 61 IN | HEART RATE: 70 BPM | OXYGEN SATURATION: 95 %

## 2017-08-31 DIAGNOSIS — I10 ESSENTIAL HYPERTENSION, BENIGN: ICD-10-CM

## 2017-08-31 DIAGNOSIS — E78.2 MIXED HYPERLIPIDEMIA: ICD-10-CM

## 2017-08-31 PROCEDURE — 99213 OFFICE O/P EST LOW 20 MIN: CPT | Performed by: INTERNAL MEDICINE

## 2017-08-31 RX ORDER — VALSARTAN 320 MG/1
320 TABLET ORAL DAILY
Qty: 30 TAB | Refills: 3 | Status: SHIPPED | OUTPATIENT
Start: 2017-08-31 | End: 2017-09-14

## 2017-08-31 RX ORDER — FUROSEMIDE 20 MG/1
20 TABLET ORAL DAILY
Qty: 90 TAB | Refills: 3 | Status: SHIPPED | OUTPATIENT
Start: 2017-08-31

## 2017-08-31 ASSESSMENT — ENCOUNTER SYMPTOMS
PND: 0
COUGH: 0
ORTHOPNEA: 0
MYALGIAS: 0
FALLS: 0
WHEEZING: 0
BRUISES/BLEEDS EASILY: 0

## 2017-08-31 NOTE — LETTER
[facility]   [address]   [address]  [place] [address]  Phone: [phone]  Fax: [phone]              Kendrick Schilling  1940    Encounter Date: 8/31/2017    Levy Martinez M.D.          PROGRESS NOTE:  Subjective:   Kendrick Schilling is a 76 y.o. male who presents today For follow-up of his hypertension and lipids to consolidate his medical regimen after hip surgery. He has now recovered and is doing physical therapy and had no complications. He is actually doing quite well but he is on an extensive combination of drugs.  His daughter actually did a paper on his medical regimen and the potential for interaction and she is completely correct.  That was actually the purpose of today's visit    Past Medical History:   Diagnosis Date   • Vascular calcification 2/11/2016    Noted incidentally on orthopedic plane films of the hips    • Sleep apnea, obstructive 2/11/2016    Uses CPAP and is regularly followed by Dr. Palmer; unable to estimate right ventricular systolic pressure on echo 2016 because of absent tricuspid regurgitation but no indirect evidence of pulmonary hypertension   • Hyperlipidemia 2/16/2010   • DM (diabetes mellitus), type 2, uncontrolled (CMS-HCC) 2/16/2010   • Arthritis of right hip    • Hypertension      Past Surgical History:   Procedure Laterality Date   • CATARACT EXTRACTION WITH IOL Bilateral    • KNEE REPLACEMENT, TOTAL Bilateral    • TONSILLECTOMY     • UROLOGY SURGERY      cystocele repair     Family History   Problem Relation Age of Onset   • Dementia Mother 79   • Heart Disease Father    • Heart Attack Father 69   • Cancer Brother 67     lymphoma   • Dementia Brother      History   Smoking Status   • Never Smoker   Smokeless Tobacco   • Never Used     Allergies   Allergen Reactions   • Vicodin [Hydrocodone-Acetaminophen]      Outpatient Encounter Prescriptions as of 8/31/2017   Medication Sig Dispense  Refill   • furosemide (LASIX) 20 MG Tab Take 1 Tab by mouth every day. 90 Tab 3   • valsartan (DIOVAN) 320 MG tablet Take 1 Tab by mouth every day. 30 Tab 3   • TRULICITY 0.75 MG/0.5ML Solution Pen-injector      • omeprazole (PRILOSEC) 20 MG delayed-release capsule      • ZETIA 10 MG Tab      • finasteride (PROSCAR) 5 MG Tab      • simvastatin (ZOCOR) 40 MG Tab      • tadalafil (CIALIS) 5 MG tablet Take 1 Tab by mouth as needed. 90 Tab 3   • metoprolol SR (TOPROL XL) 50 MG TABLET SR 24 HR Take 1 Tab by mouth every day. 90 Tab 3   • [DISCONTINUED] Multiple Vitamins-Minerals (OCUVITE) Tab Take 1 Tab by mouth every day.     • [DISCONTINUED] Coenzyme Q10 (CO Q 10 PO) Take  by mouth.     • [DISCONTINUED] Plant Sterols and Stanols (CHOLEST OFF PO) Take  by mouth.     • gabapentin (NEURONTIN) 300 MG Cap      • Diclofenac Sodium 1 % GEL Apply 1 Application to skin as directed 2 Times a Day. 1 Tube 2   • [DISCONTINUED] naproxen (NAPROSYN) 500 MG TABS Take 1 Tab by mouth 2 times a day, with meals. 60 Tab 3   • metformin (GLUCOPHAGE) 500 MG TABS TAKE 1 TABLET BY MOUTH EVERY MORNING 90 Tab 0   • aspirin EC (ECOTRIN) 81 MG TBEC Take 81 mg by mouth every day.     • docosahexanoic acid (OMEGA 3 FA) 1000 MG CAPS Take 1,000 mg by mouth 2 Times a Day.     • glucosamine Sulfate 500 MG CAPS Take 500 mg by mouth 2 times a day, with meals.     • cyanocobalamin (VITAMIN B-12) 100 MCG TABS Take 100 mcg by mouth every day.     • cholecalciferol (D-3-5) 5000 UNIT CAPS Take 5,000 Units by mouth every day.     • [DISCONTINUED] niacin SR (NIASPAN) 500 MG TBCR Take 500 mg by mouth 2 times a day.     • [DISCONTINUED] amlodipine (NORVASC) 5 MG Tab Take 1 Tab by mouth every day. 90 Tab 3   • [DISCONTINUED] valsartan-hydrochlorothiazide (DIOVAN-HCT) 320-25 MG per tablet Take 1 Tab by mouth every day. 90 Tab 3   • [DISCONTINUED] furosemide (LASIX) 20 MG TABS Take 2 at noon 120 Tab 0     No facility-administered encounter medications on file as of  "8/31/2017.      Review of Systems   HENT: Negative for nosebleeds.    Respiratory: Negative for cough and wheezing.    Cardiovascular: Negative for orthopnea and PND.   Musculoskeletal: Negative for falls and myalgias.   Endo/Heme/Allergies: Does not bruise/bleed easily.        Objective:   BP (!) 92/58   Pulse 70   Ht 1.549 m (5' 1\")   Wt 109.3 kg (241 lb)   SpO2 95%   BMI 45.54 kg/m²      Physical Exam   Constitutional: He is oriented to person, place, and time. He appears well-nourished. No distress.   Eyes: Conjunctivae are normal. No scleral icterus.   Neck: No JVD present.   Cardiovascular: Normal rate, regular rhythm, normal heart sounds and intact distal pulses.  Exam reveals no gallop.    No murmur heard.  Pulmonary/Chest: Effort normal and breath sounds normal.   Musculoskeletal: He exhibits no edema.   Neurological: He is alert and oriented to person, place, and time.   Skin: Skin is warm and dry. He is not diaphoretic.   Psychiatric: He has a normal mood and affect. Thought content normal.       Assessment:     1. Essential hypertension, benign  furosemide (LASIX) 20 MG Tab    valsartan (DIOVAN) 320 MG tablet   2. Mixed hyperlipidemia  COMP METABOLIC PANEL    LIPID PROFILE   Blood pressure is over controlled and he is on a regimen with significant potential for interaction. The above drug list actually includes the changes made today.  Medical Decision Making:  Today's Assessment / Status / Plan:   Stop amlodipine. Consolidate his diuretics to furosemide 20 mg daily with no hydrochlorothiazide. Continue valsartan and metoprolol. For now continue the combination of ezetimibe 10 mg daily and simvastatin 40 mg daily taken as separate tablets and fish oil but stop niacin.  He will also stop Cholest-off just to see what the above combination will do without it and he also stopped his CoQ10 and Ocuvite although I have no objection to continuing those.  He will check lab next week and will follow-up with " Dr. Trejo as well.  Reassess blood pressure in one to 2 weeks here but he'll have interval visit with Dr. Trejo.      No Recipients

## 2017-08-31 NOTE — PROGRESS NOTES
Subjective:   Kendrick Schilling is a 76 y.o. male who presents today For follow-up of his hypertension and lipids to consolidate his medical regimen after hip surgery. He has now recovered and is doing physical therapy and had no complications. He is actually doing quite well but he is on an extensive combination of drugs.  His daughter actually did a paper on his medical regimen and the potential for interaction and she is completely correct.  That was actually the purpose of today's visit    Past Medical History:   Diagnosis Date   • Vascular calcification 2/11/2016    Noted incidentally on orthopedic plane films of the hips    • Sleep apnea, obstructive 2/11/2016    Uses CPAP and is regularly followed by Dr. Palmer; unable to estimate right ventricular systolic pressure on echo 2016 because of absent tricuspid regurgitation but no indirect evidence of pulmonary hypertension   • Hyperlipidemia 2/16/2010   • DM (diabetes mellitus), type 2, uncontrolled (CMS-[facility]) 2/16/2010   • Arthritis of right hip    • Hypertension      Past Surgical History:   Procedure Laterality Date   • CATARACT EXTRACTION WITH IOL Bilateral    • KNEE REPLACEMENT, TOTAL Bilateral    • TONSILLECTOMY     • UROLOGY SURGERY      cystocele repair     Family History   Problem Relation Age of Onset   • Dementia Mother 79   • Heart Disease Father    • Heart Attack Father 69   • Cancer Brother 67     lymphoma   • Dementia Brother      History   Smoking Status   • Never Smoker   Smokeless Tobacco   • Never Used     Allergies   Allergen Reactions   • Vicodin [Hydrocodone-Acetaminophen]      Outpatient Encounter Prescriptions as of 8/31/2017   Medication Sig Dispense Refill   • furosemide (LASIX) 20 MG Tab Take 1 Tab by mouth every day. 90 Tab 3   • valsartan (DIOVAN) 320 MG tablet Take 1 Tab by mouth every day. 30 Tab 3   • TRULICITY 0.75 MG/0.5ML Solution Pen-injector      • omeprazole (PRILOSEC) 20 MG delayed-release capsule      • ZETIA 10 MG Tab      •  finasteride (PROSCAR) 5 MG Tab      • simvastatin (ZOCOR) 40 MG Tab      • tadalafil (CIALIS) 5 MG tablet Take 1 Tab by mouth as needed. 90 Tab 3   • metoprolol SR (TOPROL XL) 50 MG TABLET SR 24 HR Take 1 Tab by mouth every day. 90 Tab 3   • [DISCONTINUED] Multiple Vitamins-Minerals (OCUVITE) Tab Take 1 Tab by mouth every day.     • [DISCONTINUED] Coenzyme Q10 (CO Q 10 PO) Take  by mouth.     • [DISCONTINUED] Plant Sterols and Stanols (CHOLEST OFF PO) Take  by mouth.     • gabapentin (NEURONTIN) 300 MG Cap      • Diclofenac Sodium 1 % GEL Apply 1 Application to skin as directed 2 Times a Day. 1 Tube 2   • [DISCONTINUED] naproxen (NAPROSYN) 500 MG TABS Take 1 Tab by mouth 2 times a day, with meals. 60 Tab 3   • metformin (GLUCOPHAGE) 500 MG TABS TAKE 1 TABLET BY MOUTH EVERY MORNING 90 Tab 0   • aspirin EC (ECOTRIN) 81 MG TBEC Take 81 mg by mouth every day.     • docosahexanoic acid (OMEGA 3 FA) 1000 MG CAPS Take 1,000 mg by mouth 2 Times a Day.     • glucosamine Sulfate 500 MG CAPS Take 500 mg by mouth 2 times a day, with meals.     • cyanocobalamin (VITAMIN B-12) 100 MCG TABS Take 100 mcg by mouth every day.     • cholecalciferol (D-3-5) 5000 UNIT CAPS Take 5,000 Units by mouth every day.     • [DISCONTINUED] niacin SR (NIASPAN) 500 MG TBCR Take 500 mg by mouth 2 times a day.     • [DISCONTINUED] amlodipine (NORVASC) 5 MG Tab Take 1 Tab by mouth every day. 90 Tab 3   • [DISCONTINUED] valsartan-hydrochlorothiazide (DIOVAN-HCT) 320-25 MG per tablet Take 1 Tab by mouth every day. 90 Tab 3   • [DISCONTINUED] furosemide (LASIX) 20 MG TABS Take 2 at noon 120 Tab 0     No facility-administered encounter medications on file as of 8/31/2017.      Review of Systems   HENT: Negative for nosebleeds.    Respiratory: Negative for cough and wheezing.    Cardiovascular: Negative for orthopnea and PND.   Musculoskeletal: Negative for falls and myalgias.   Endo/Heme/Allergies: Does not bruise/bleed easily.        Objective:   BP (!)  "92/58   Pulse 70   Ht 1.549 m (5' 1\")   Wt 109.3 kg (241 lb)   SpO2 95%   BMI 45.54 kg/m²     Physical Exam   Constitutional: He is oriented to person, place, and time. He appears well-nourished. No distress.   Eyes: Conjunctivae are normal. No scleral icterus.   Neck: No JVD present.   Cardiovascular: Normal rate, regular rhythm, normal heart sounds and intact distal pulses.  Exam reveals no gallop.    No murmur heard.  Pulmonary/Chest: Effort normal and breath sounds normal.   Musculoskeletal: He exhibits no edema.   Neurological: He is alert and oriented to person, place, and time.   Skin: Skin is warm and dry. He is not diaphoretic.   Psychiatric: He has a normal mood and affect. Thought content normal.       Assessment:     1. Essential hypertension, benign  furosemide (LASIX) 20 MG Tab    valsartan (DIOVAN) 320 MG tablet   2. Mixed hyperlipidemia  COMP METABOLIC PANEL    LIPID PROFILE   Blood pressure is over controlled and he is on a regimen with significant potential for interaction. The above drug list actually includes the changes made today.  Medical Decision Making:  Today's Assessment / Status / Plan:   Stop amlodipine. Consolidate his diuretics to furosemide 20 mg daily with no hydrochlorothiazide. Continue valsartan and metoprolol. For now continue the combination of ezetimibe 10 mg daily and simvastatin 40 mg daily taken as separate tablets and fish oil but stop niacin.  He will also stop Cholest-off just to see what the above combination will do without it and he also stopped his CoQ10 and Ocuvite although I have no objection to continuing those.  He will check lab next week and will follow-up with Dr. Trejo as well.  Reassess blood pressure in one to 2 weeks here but he'll have interval visit with Dr. Trejo.  "

## 2017-09-12 LAB
ALBUMIN SERPL-MCNC: 4.2 G/DL (ref 3.5–4.8)
ALBUMIN/GLOB SERPL: 2.1 {RATIO} (ref 1.2–2.2)
ALP SERPL-CCNC: 70 IU/L (ref 39–117)
ALT SERPL-CCNC: 18 IU/L (ref 0–44)
AST SERPL-CCNC: 22 IU/L (ref 0–40)
BILIRUB SERPL-MCNC: 0.4 MG/DL (ref 0–1.2)
BUN SERPL-MCNC: 22 MG/DL (ref 8–27)
BUN/CREAT SERPL: 26 (ref 10–24)
CALCIUM SERPL-MCNC: 9.3 MG/DL (ref 8.6–10.2)
CHLORIDE SERPL-SCNC: 101 MMOL/L (ref 96–106)
CHOLEST SERPL-MCNC: 154 MG/DL (ref 100–199)
CO2 SERPL-SCNC: 23 MMOL/L (ref 18–29)
COMMENT 011824: NORMAL
CREAT SERPL-MCNC: 0.84 MG/DL (ref 0.76–1.27)
GLOBULIN SER CALC-MCNC: 2 G/DL (ref 1.5–4.5)
GLUCOSE SERPL-MCNC: 109 MG/DL (ref 65–99)
HDLC SERPL-MCNC: 69 MG/DL
LDLC SERPL CALC-MCNC: 68 MG/DL (ref 0–99)
POTASSIUM SERPL-SCNC: 4.4 MMOL/L (ref 3.5–5.2)
PROT SERPL-MCNC: 6.2 G/DL (ref 6–8.5)
SODIUM SERPL-SCNC: 141 MMOL/L (ref 134–144)
TRIGL SERPL-MCNC: 85 MG/DL (ref 0–149)
VLDLC SERPL CALC-MCNC: 17 MG/DL (ref 5–40)

## 2017-09-14 ENCOUNTER — OFFICE VISIT (OUTPATIENT)
Dept: CARDIOLOGY | Facility: MEDICAL CENTER | Age: 77
End: 2017-09-14
Payer: MEDICARE

## 2017-09-14 VITALS
DIASTOLIC BLOOD PRESSURE: 60 MMHG | HEART RATE: 74 BPM | OXYGEN SATURATION: 96 % | SYSTOLIC BLOOD PRESSURE: 96 MMHG | WEIGHT: 238 LBS | BODY MASS INDEX: 44.93 KG/M2 | HEIGHT: 61 IN

## 2017-09-14 DIAGNOSIS — I10 ESSENTIAL HYPERTENSION, BENIGN: ICD-10-CM

## 2017-09-14 PROCEDURE — 99212 OFFICE O/P EST SF 10 MIN: CPT | Performed by: INTERNAL MEDICINE

## 2017-09-14 RX ORDER — VALSARTAN 320 MG/1
160 TABLET ORAL DAILY
Qty: 30 TAB | Refills: 3
Start: 2017-09-14 | End: 2017-10-09

## 2017-09-14 RX ORDER — AMPICILLIN TRIHYDRATE 250 MG
CAPSULE ORAL
COMMUNITY

## 2017-09-14 NOTE — LETTER
[facility]   [address]   [address]  [place] [address]  Phone: [phone]  Fax: [phone]              Kendrick Schilling  1940    Encounter Date: 9/14/2017    Levy Martinez M.D.          PROGRESS NOTE:  Subjective:   Kendrick Schilling is a 76 y.o. male who presents today for blood pressure check.  No cardiac symptoms at all.    Past Medical History:   Diagnosis Date   • Vascular calcification 2/11/2016    Noted incidentally on orthopedic plane films of the hips    • Sleep apnea, obstructive 2/11/2016    Uses CPAP and is regularly followed by Dr. Palmer; unable to estimate right ventricular systolic pressure on echo 2016 because of absent tricuspid regurgitation but no indirect evidence of pulmonary hypertension   • Hyperlipidemia 2/16/2010   • DM (diabetes mellitus), type 2, uncontrolled (CMS-HCC) 2/16/2010   • Arthritis of right hip    • Hypertension      Past Surgical History:   Procedure Laterality Date   • CATARACT EXTRACTION WITH IOL Bilateral    • KNEE REPLACEMENT, TOTAL Bilateral    • TONSILLECTOMY     • UROLOGY SURGERY      cystocele repair     Family History   Problem Relation Age of Onset   • Dementia Mother 79   • Heart Disease Father    • Heart Attack Father 69   • Cancer Brother 67     lymphoma   • Dementia Brother      History   Smoking Status   • Never Smoker   Smokeless Tobacco   • Never Used     Allergies   Allergen Reactions   • Vicodin [Hydrocodone-Acetaminophen]      Outpatient Encounter Prescriptions as of 9/14/2017   Medication Sig Dispense Refill   • Cinnamon 500 MG Cap Take  by mouth.     • Calcium Polycarbophil (FIBER-CAPS PO) Take  by mouth.     • furosemide (LASIX) 20 MG Tab Take 1 Tab by mouth every day. 90 Tab 3   • valsartan (DIOVAN) 320 MG tablet Take 1 Tab by mouth every day. 30 Tab 3   • TRULICITY 0.75 MG/0.5ML Solution Pen-injector      • omeprazole (PRILOSEC) 20 MG delayed-release capsule      •  "ZETIA 10 MG Tab      • simvastatin (ZOCOR) 40 MG Tab      • tadalafil (CIALIS) 5 MG tablet Take 1 Tab by mouth as needed. 90 Tab 3   • metoprolol SR (TOPROL XL) 50 MG TABLET SR 24 HR Take 1 Tab by mouth every day. 90 Tab 3   • gabapentin (NEURONTIN) 300 MG Cap      • Diclofenac Sodium 1 % GEL Apply 1 Application to skin as directed 2 Times a Day. 1 Tube 2   • metformin (GLUCOPHAGE) 500 MG TABS TAKE 1 TABLET BY MOUTH EVERY MORNING 90 Tab 0   • aspirin EC (ECOTRIN) 81 MG TBEC Take 81 mg by mouth every day.     • docosahexanoic acid (OMEGA 3 FA) 1000 MG CAPS Take 1,000 mg by mouth 2 Times a Day.     • glucosamine Sulfate 500 MG CAPS Take 500 mg by mouth 2 times a day, with meals.     • cyanocobalamin (VITAMIN B-12) 100 MCG TABS Take 100 mcg by mouth every day.     • cholecalciferol (D-3-5) 5000 UNIT CAPS Take 5,000 Units by mouth every day.     • finasteride (PROSCAR) 5 MG Tab        No facility-administered encounter medications on file as of 9/14/2017.      ROS     Objective:   BP (!) 96/60   Pulse 74   Ht 1.549 m (5' 1\")   Wt 108 kg (238 lb)   SpO2 96%   BMI 44.97 kg/m²      Physical Exam  Exam otherwise unchanged and lab data were very satisfactory.  Assessment:     1. Essential hypertension, benign       Still overcontrolled.  Medical Decision Making:  Today's Assessment / Status / Plan:     Reduce losartan to 160 mg daily by taking one half tablet daily and follow-up blood pressure check in 3-4 weeks with home blood pressure checks in the meantime. He is also see due to see Dr. Trejo about 6 weeks. Immediate reevaluation if any problems.      No Recipients              "

## 2017-09-14 NOTE — PROGRESS NOTES
Subjective:   Kendrick Schilling is a 76 y.o. male who presents today for blood pressure check.  No cardiac symptoms at all.    Past Medical History:   Diagnosis Date   • Vascular calcification 2/11/2016    Noted incidentally on orthopedic plane films of the hips    • Sleep apnea, obstructive 2/11/2016    Uses CPAP and is regularly followed by Dr. Palmer; unable to estimate right ventricular systolic pressure on echo 2016 because of absent tricuspid regurgitation but no indirect evidence of pulmonary hypertension   • Hyperlipidemia 2/16/2010   • DM (diabetes mellitus), type 2, uncontrolled (CMS-HCC) 2/16/2010   • Arthritis of right hip    • Hypertension      Past Surgical History:   Procedure Laterality Date   • CATARACT EXTRACTION WITH IOL Bilateral    • KNEE REPLACEMENT, TOTAL Bilateral    • TONSILLECTOMY     • UROLOGY SURGERY      cystocele repair     Family History   Problem Relation Age of Onset   • Dementia Mother 79   • Heart Disease Father    • Heart Attack Father 69   • Cancer Brother 67     lymphoma   • Dementia Brother      History   Smoking Status   • Never Smoker   Smokeless Tobacco   • Never Used     Allergies   Allergen Reactions   • Vicodin [Hydrocodone-Acetaminophen]      Outpatient Encounter Prescriptions as of 9/14/2017   Medication Sig Dispense Refill   • Cinnamon 500 MG Cap Take  by mouth.     • Calcium Polycarbophil (FIBER-CAPS PO) Take  by mouth.     • furosemide (LASIX) 20 MG Tab Take 1 Tab by mouth every day. 90 Tab 3   • valsartan (DIOVAN) 320 MG tablet Take 1 Tab by mouth every day. 30 Tab 3   • TRULICITY 0.75 MG/0.5ML Solution Pen-injector      • omeprazole (PRILOSEC) 20 MG delayed-release capsule      • ZETIA 10 MG Tab      • simvastatin (ZOCOR) 40 MG Tab      • tadalafil (CIALIS) 5 MG tablet Take 1 Tab by mouth as needed. 90 Tab 3   • metoprolol SR (TOPROL XL) 50 MG TABLET SR 24 HR Take 1 Tab by mouth every day. 90 Tab 3   • gabapentin (NEURONTIN) 300 MG Cap      • Diclofenac Sodium 1 % GEL  "Apply 1 Application to skin as directed 2 Times a Day. 1 Tube 2   • metformin (GLUCOPHAGE) 500 MG TABS TAKE 1 TABLET BY MOUTH EVERY MORNING 90 Tab 0   • aspirin EC (ECOTRIN) 81 MG TBEC Take 81 mg by mouth every day.     • docosahexanoic acid (OMEGA 3 FA) 1000 MG CAPS Take 1,000 mg by mouth 2 Times a Day.     • glucosamine Sulfate 500 MG CAPS Take 500 mg by mouth 2 times a day, with meals.     • cyanocobalamin (VITAMIN B-12) 100 MCG TABS Take 100 mcg by mouth every day.     • cholecalciferol (D-3-5) 5000 UNIT CAPS Take 5,000 Units by mouth every day.     • finasteride (PROSCAR) 5 MG Tab        No facility-administered encounter medications on file as of 9/14/2017.      ROS     Objective:   BP (!) 96/60   Pulse 74   Ht 1.549 m (5' 1\")   Wt 108 kg (238 lb)   SpO2 96%   BMI 44.97 kg/m²     Physical Exam  Exam otherwise unchanged and lab data were very satisfactory.  Assessment:     1. Essential hypertension, benign       Still overcontrolled.  Medical Decision Making:  Today's Assessment / Status / Plan:     Reduce valsartan to 160 mg daily by taking one half tablet daily and follow-up blood pressure check in 3-4 weeks with home blood pressure checks in the meantime. He is also see due to see Dr. Trejo about 6 weeks. Immediate reevaluation if any problems.  "

## 2017-10-09 ENCOUNTER — OFFICE VISIT (OUTPATIENT)
Dept: CARDIOLOGY | Facility: MEDICAL CENTER | Age: 77
End: 2017-10-09
Payer: MEDICARE

## 2017-10-09 VITALS
HEIGHT: 66 IN | SYSTOLIC BLOOD PRESSURE: 100 MMHG | HEART RATE: 100 BPM | DIASTOLIC BLOOD PRESSURE: 60 MMHG | BODY MASS INDEX: 37.93 KG/M2 | WEIGHT: 236 LBS

## 2017-10-09 DIAGNOSIS — I44.0 FIRST DEGREE AV BLOCK: ICD-10-CM

## 2017-10-09 DIAGNOSIS — I10 ESSENTIAL HYPERTENSION, BENIGN: ICD-10-CM

## 2017-10-09 LAB — EKG IMPRESSION: NORMAL

## 2017-10-09 PROCEDURE — 93000 ELECTROCARDIOGRAM COMPLETE: CPT | Performed by: INTERNAL MEDICINE

## 2017-10-09 PROCEDURE — 99214 OFFICE O/P EST MOD 30 MIN: CPT | Performed by: INTERNAL MEDICINE

## 2017-10-09 RX ORDER — VALSARTAN 80 MG/1
80 TABLET ORAL DAILY
Qty: 30 TAB | Refills: 3 | Status: SHIPPED | OUTPATIENT
Start: 2017-10-09 | End: 2017-11-06 | Stop reason: SDUPTHER

## 2017-10-09 NOTE — LETTER
[facility]   [address]   [address]  [place] [address]  Phone: [phone]  Fax: [phone]              Kendrick Schilling  1940    Encounter Date: 10/9/2017    Levy Martinez M.D.          PROGRESS NOTE:  Subjective:   Kendrick Schilling is a 76 y.o. male who presents today For blood pressure follow-up. Pressure is still overcontrolled. He has no symptoms at all.    Past Medical History:   Diagnosis Date   • Vascular calcification 2/11/2016    Noted incidentally on orthopedic plane films of the hips    • Sleep apnea, obstructive 2/11/2016    Uses CPAP and is regularly followed by Dr. Palmer; unable to estimate right ventricular systolic pressure on echo 2016 because of absent tricuspid regurgitation but no indirect evidence of pulmonary hypertension   • Hyperlipidemia 2/16/2010   • DM (diabetes mellitus), type 2, uncontrolled (CMS-HCC) 2/16/2010   • Arthritis of right hip    • Hypertension      Past Surgical History:   Procedure Laterality Date   • CATARACT EXTRACTION WITH IOL Bilateral    • KNEE REPLACEMENT, TOTAL Bilateral    • TONSILLECTOMY     • UROLOGY SURGERY      cystocele repair     Family History   Problem Relation Age of Onset   • Dementia Mother 79   • Heart Disease Father    • Heart Attack Father 69   • Cancer Brother 67     lymphoma   • Dementia Brother      History   Smoking Status   • Never Smoker   Smokeless Tobacco   • Never Used     Allergies   Allergen Reactions   • Vicodin [Hydrocodone-Acetaminophen]      Outpatient Encounter Prescriptions as of 10/9/2017   Medication Sig Dispense Refill   • Cinnamon 500 MG Cap Take  by mouth.     • Calcium Polycarbophil (FIBER-CAPS PO) Take  by mouth.     • furosemide (LASIX) 20 MG Tab Take 1 Tab by mouth every day. 90 Tab 3   • TRULICITY 0.75 MG/0.5ML Solution Pen-injector      • omeprazole (PRILOSEC) 20 MG delayed-release capsule      • ZETIA 10 MG Tab      • simvastatin (ZOCOR)  "40 MG Tab      • tadalafil (CIALIS) 5 MG tablet Take 1 Tab by mouth as needed. 90 Tab 3   • metoprolol SR (TOPROL XL) 50 MG TABLET SR 24 HR Take 1 Tab by mouth every day. 90 Tab 3   • gabapentin (NEURONTIN) 300 MG Cap      • Diclofenac Sodium 1 % GEL Apply 1 Application to skin as directed 2 Times a Day. 1 Tube 2   • metformin (GLUCOPHAGE) 500 MG TABS TAKE 1 TABLET BY MOUTH EVERY MORNING 90 Tab 0   • aspirin EC (ECOTRIN) 81 MG TBEC Take 81 mg by mouth every day.     • docosahexanoic acid (OMEGA 3 FA) 1000 MG CAPS Take 1,000 mg by mouth 2 Times a Day.     • glucosamine Sulfate 500 MG CAPS Take 500 mg by mouth 2 times a day, with meals.     • cyanocobalamin (VITAMIN B-12) 100 MCG TABS Take 100 mcg by mouth every day.     • cholecalciferol (D-3-5) 5000 UNIT CAPS Take 5,000 Units by mouth every day.     • [DISCONTINUED] valsartan (DIOVAN) 320 MG tablet Take 0.5 Tabs by mouth every day. 30 Tab 3   • finasteride (PROSCAR) 5 MG Tab        No facility-administered encounter medications on file as of 10/9/2017.      ROS     Objective:   /60   Pulse 100   Ht 1.676 m (5' 6\")   Wt 107 kg (236 lb)   BMI 38.09 kg/m²      Physical Exam  Exam is unchanged except for the heart rate which is regular. Because of that I did an EKG which shows sinus rhythm with no ectopy. He does have borderline first-degree AV block and nonspecific T-wave abnormalities.  Assessment:     1. Essential hypertension, benign  [facility] EP[facility] EKG (Clinic Performed)    valsartan (DIOVAN) 80 MG Tab   2. First degree AV block       Blood pressure still overcontrolled.    Medical Decision Making:  Today's Assessment / Status / Plan:     Reduce valsartan to 80 mg daily.  Follow-up in 4 weeks in immediately if any symptoms.      Vahid Trejo M.D.  [address]  [address]  [place] [address]  VIA Facsimile: [phone]                 "

## 2017-10-09 NOTE — PROGRESS NOTES
Subjective:   Kendrick Schilling is a 76 y.o. male who presents today For blood pressure follow-up. Pressure is still overcontrolled. He has no symptoms at all.    Past Medical History:   Diagnosis Date   • Vascular calcification 2/11/2016    Noted incidentally on orthopedic plane films of the hips    • Sleep apnea, obstructive 2/11/2016    Uses CPAP and is regularly followed by Dr. Palmer; unable to estimate right ventricular systolic pressure on echo 2016 because of absent tricuspid regurgitation but no indirect evidence of pulmonary hypertension   • Hyperlipidemia 2/16/2010   • DM (diabetes mellitus), type 2, uncontrolled (CMS-HCC) 2/16/2010   • Arthritis of right hip    • Hypertension      Past Surgical History:   Procedure Laterality Date   • CATARACT EXTRACTION WITH IOL Bilateral    • KNEE REPLACEMENT, TOTAL Bilateral    • TONSILLECTOMY     • UROLOGY SURGERY      cystocele repair     Family History   Problem Relation Age of Onset   • Dementia Mother 79   • Heart Disease Father    • Heart Attack Father 69   • Cancer Brother 67     lymphoma   • Dementia Brother      History   Smoking Status   • Never Smoker   Smokeless Tobacco   • Never Used     Allergies   Allergen Reactions   • Vicodin [Hydrocodone-Acetaminophen]      Outpatient Encounter Prescriptions as of 10/9/2017   Medication Sig Dispense Refill   • Cinnamon 500 MG Cap Take  by mouth.     • Calcium Polycarbophil (FIBER-CAPS PO) Take  by mouth.     • furosemide (LASIX) 20 MG Tab Take 1 Tab by mouth every day. 90 Tab 3   • TRULICITY 0.75 MG/0.5ML Solution Pen-injector      • omeprazole (PRILOSEC) 20 MG delayed-release capsule      • ZETIA 10 MG Tab      • simvastatin (ZOCOR) 40 MG Tab      • tadalafil (CIALIS) 5 MG tablet Take 1 Tab by mouth as needed. 90 Tab 3   • metoprolol SR (TOPROL XL) 50 MG TABLET SR 24 HR Take 1 Tab by mouth every day. 90 Tab 3   • gabapentin (NEURONTIN) 300 MG Cap      • Diclofenac Sodium 1 % GEL Apply 1 Application to skin as directed  "2 Times a Day. 1 Tube 2   • metformin (GLUCOPHAGE) 500 MG TABS TAKE 1 TABLET BY MOUTH EVERY MORNING 90 Tab 0   • aspirin EC (ECOTRIN) 81 MG TBEC Take 81 mg by mouth every day.     • docosahexanoic acid (OMEGA 3 FA) 1000 MG CAPS Take 1,000 mg by mouth 2 Times a Day.     • glucosamine Sulfate 500 MG CAPS Take 500 mg by mouth 2 times a day, with meals.     • cyanocobalamin (VITAMIN B-12) 100 MCG TABS Take 100 mcg by mouth every day.     • cholecalciferol (D-3-5) 5000 UNIT CAPS Take 5,000 Units by mouth every day.     • [DISCONTINUED] valsartan (DIOVAN) 320 MG tablet Take 0.5 Tabs by mouth every day. 30 Tab 3   • finasteride (PROSCAR) 5 MG Tab        No facility-administered encounter medications on file as of 10/9/2017.      ROS     Objective:   /60   Pulse 100   Ht 1.676 m (5' 6\")   Wt 107 kg (236 lb)   BMI 38.09 kg/m²     Physical Exam  Exam is unchanged except for the heart rate which is regular. Because of that I did an EKG which shows sinus rhythm with no ectopy. He does have borderline first-degree AV block and nonspecific T-wave abnormalities.  Assessment:     1. Essential hypertension, benign  [facility] EKG (Clinic Performed)    valsartan (DIOVAN) 80 MG Tab   2. First degree AV block       Blood pressure still overcontrolled.    Medical Decision Making:  Today's Assessment / Status / Plan:     Reduce valsartan to 80 mg daily.  Follow-up in 4 weeks in immediately if any symptoms.  "

## 2017-11-06 ENCOUNTER — OFFICE VISIT (OUTPATIENT)
Dept: CARDIOLOGY | Facility: MEDICAL CENTER | Age: 77
End: 2017-11-06
Payer: MEDICARE

## 2017-11-06 VITALS
HEART RATE: 70 BPM | WEIGHT: 238 LBS | DIASTOLIC BLOOD PRESSURE: 80 MMHG | SYSTOLIC BLOOD PRESSURE: 120 MMHG | BODY MASS INDEX: 43.79 KG/M2 | OXYGEN SATURATION: 90 % | HEIGHT: 62 IN

## 2017-11-06 DIAGNOSIS — I10 ESSENTIAL HYPERTENSION, BENIGN: ICD-10-CM

## 2017-11-06 DIAGNOSIS — E11.21 TYPE 2 DIABETES MELLITUS WITH DIABETIC NEPHROPATHY, WITHOUT LONG-TERM CURRENT USE OF INSULIN (HCC): ICD-10-CM

## 2017-11-06 PROCEDURE — 99213 OFFICE O/P EST LOW 20 MIN: CPT | Performed by: INTERNAL MEDICINE

## 2017-11-06 RX ORDER — DULAGLUTIDE 0.75 MG/.5ML
0.75 INJECTION, SOLUTION SUBCUTANEOUS
Qty: 4 PEN | Refills: 3 | Status: SHIPPED | OUTPATIENT
Start: 2017-11-06 | End: 2018-01-19

## 2017-11-06 RX ORDER — VALSARTAN 80 MG/1
80 TABLET ORAL DAILY
Qty: 90 TAB | Refills: 3 | Status: SHIPPED | OUTPATIENT
Start: 2017-11-06

## 2017-11-06 RX ORDER — METOPROLOL SUCCINATE 25 MG/1
25 TABLET, EXTENDED RELEASE ORAL DAILY
Qty: 90 TAB | Refills: 3 | Status: SHIPPED | OUTPATIENT
Start: 2017-11-06 | End: 2018-01-19 | Stop reason: SINTOL

## 2017-11-06 ASSESSMENT — LIFESTYLE VARIABLES: SUBSTANCE_ABUSE: 0

## 2017-11-06 ASSESSMENT — ENCOUNTER SYMPTOMS
DIZZINESS: 0
ORTHOPNEA: 0
PND: 0

## 2017-11-06 NOTE — LETTER
[facility]   [address]   [address]  [place] [address]  Phone: [phone]  Fax: [phone]              Kendrick Schilling  1940    Encounter Date: 11/6/2017    Levy Martinez M.D.          PROGRESS NOTE:  Subjective:   Kendrick Schilling is a 76 y.o. male who presents today For blood pressure check. Orthostatic symptoms are dramatically better.    Past Medical History:   Diagnosis Date   • Arthritis of right hip    • DM (diabetes mellitus), type 2, uncontrolled (CMS-[facility]) 2/16/2010   • Hyperlipidemia 2/16/2010   • Hypertension    • Sleep apnea, obstructive 2/11/2016    Uses CPAP and is regularly followed by Dr. Palmer; unable to estimate right ventricular systolic pressure on echo 2016 because of absent tricuspid regurgitation but no indirect evidence of pulmonary hypertension   • Vascular calcification 2/11/2016    Noted incidentally on orthopedic plane films of the hips      Past Surgical History:   Procedure Laterality Date   • CATARACT EXTRACTION WITH IOL Bilateral    • KNEE REPLACEMENT, TOTAL Bilateral    • TONSILLECTOMY     • UROLOGY SURGERY      cystocele repair     Family History   Problem Relation Age of Onset   • Dementia Mother 79   • Heart Disease Father    • Heart Attack Father 69   • Cancer Brother 67     lymphoma   • Dementia Brother      History   Smoking Status   • Never Smoker   Smokeless Tobacco   • Never Used     Allergies   Allergen Reactions   • Vicodin [Hydrocodone-Acetaminophen]      Outpatient Encounter Prescriptions as of 11/6/2017   Medication Sig Dispense Refill   • metoprolol SR (TOPROL XL) 25 MG TABLET SR 24 HR Take 1 Tab by mouth every day. 90 Tab 3   • valsartan (DIOVAN) 80 MG Tab Take 1 Tab by mouth every day. 90 Tab 3   • TRULICITY 0.75 MG/0.5ML Solution Pen-injector Inject 0.75 mg as instructed every 7 days. 4 PEN 3   • Cinnamon 500 MG Cap Take  by mouth.     • Calcium Polycarbophil (FIBER-CAPS PO)  "Take  by mouth.     • furosemide (LASIX) 20 MG Tab Take 1 Tab by mouth every day. 90 Tab 3   • omeprazole (PRILOSEC) 20 MG delayed-release capsule      • ZETIA 10 MG Tab      • finasteride (PROSCAR) 5 MG Tab      • simvastatin (ZOCOR) 40 MG Tab      • tadalafil (CIALIS) 5 MG tablet Take 1 Tab by mouth as needed. 90 Tab 3   • gabapentin (NEURONTIN) 300 MG Cap      • Diclofenac Sodium 1 % GEL Apply 1 Application to skin as directed 2 Times a Day. 1 Tube 2   • metformin (GLUCOPHAGE) 500 MG TABS TAKE 1 TABLET BY MOUTH EVERY MORNING 90 Tab 0   • aspirin EC (ECOTRIN) 81 MG TBEC Take 81 mg by mouth every day.     • docosahexanoic acid (OMEGA 3 FA) 1000 MG CAPS Take 1,000 mg by mouth 2 Times a Day.     • glucosamine Sulfate 500 MG CAPS Take 500 mg by mouth 2 times a day, with meals.     • cyanocobalamin (VITAMIN B-12) 100 MCG TABS Take 100 mcg by mouth every day.     • cholecalciferol (D-3-5) 5000 UNIT CAPS Take 5,000 Units by mouth every day.     • [DISCONTINUED] valsartan (DIOVAN) 80 MG Tab Take 1 Tab by mouth every day. 30 Tab 3   • [DISCONTINUED] TRULICITY 0.75 MG/0.5ML Solution Pen-injector      • [DISCONTINUED] metoprolol SR (TOPROL XL) 50 MG TABLET SR 24 HR Take 1 Tab by mouth every day. 90 Tab 3     No facility-administered encounter medications on file as of 11/6/2017.      Review of Systems   Cardiovascular: Negative for orthopnea and PND.   Neurological: Negative for dizziness.   Psychiatric/Behavioral: Negative for substance abuse.        Objective:   /80   Pulse 70   Ht 1.575 m (5' 2\")   Wt 108 kg (238 lb)   SpO2 90%   BMI 43.53 kg/m²      Physical Exam  Exam is otherwise unchanged.  Assessment:     1. Essential hypertension, benign  metoprolol SR (TOPROL XL) 25 MG TABLET SR 24 HR    valsartan (DIOVAN) 80 MG Tab    BASIC METABOLIC PANEL   2. Type 2 diabetes mellitus with diabetic nephropathy, without long-term current use of insulin (CMS-HCC)  TRULICITY 0.75 MG/0.5ML Solution Pen-injector     Blood " pressure status is better. I renewed his Trulicity so that he will not run out before he can see Dr. Trejo  Medical Decision Making:  Today's Assessment / Status / Plan:     Continue the current cardiovascular regimen.  Continue primary follow up with  Dr. Trejo.   Cardiology follow up in 3 months and  sooner if needed for any change.   Lab before end of the year to track potassium.  Use of the emergency medical system reviewed.       No Recipients

## 2017-11-07 NOTE — PROGRESS NOTES
Subjective:   Kendrick Schilling is a 76 y.o. male who presents today For blood pressure check. Orthostatic symptoms are dramatically better.    Past Medical History:   Diagnosis Date   • Arthritis of right hip    • DM (diabetes mellitus), type 2, uncontrolled (CMS-HCC) 2/16/2010   • Hyperlipidemia 2/16/2010   • Hypertension    • Sleep apnea, obstructive 2/11/2016    Uses CPAP and is regularly followed by Dr. Palmer; unable to estimate right ventricular systolic pressure on echo 2016 because of absent tricuspid regurgitation but no indirect evidence of pulmonary hypertension   • Vascular calcification 2/11/2016    Noted incidentally on orthopedic plane films of the hips      Past Surgical History:   Procedure Laterality Date   • CATARACT EXTRACTION WITH IOL Bilateral    • KNEE REPLACEMENT, TOTAL Bilateral    • TONSILLECTOMY     • UROLOGY SURGERY      cystocele repair     Family History   Problem Relation Age of Onset   • Dementia Mother 79   • Heart Disease Father    • Heart Attack Father 69   • Cancer Brother 67     lymphoma   • Dementia Brother      History   Smoking Status   • Never Smoker   Smokeless Tobacco   • Never Used     Allergies   Allergen Reactions   • Vicodin [Hydrocodone-Acetaminophen]      Outpatient Encounter Prescriptions as of 11/6/2017   Medication Sig Dispense Refill   • metoprolol SR (TOPROL XL) 25 MG TABLET SR 24 HR Take 1 Tab by mouth every day. 90 Tab 3   • valsartan (DIOVAN) 80 MG Tab Take 1 Tab by mouth every day. 90 Tab 3   • TRULICITY 0.75 MG/0.5ML Solution Pen-injector Inject 0.75 mg as instructed every 7 days. 4 PEN 3   • Cinnamon 500 MG Cap Take  by mouth.     • Calcium Polycarbophil (FIBER-CAPS PO) Take  by mouth.     • furosemide (LASIX) 20 MG Tab Take 1 Tab by mouth every day. 90 Tab 3   • omeprazole (PRILOSEC) 20 MG delayed-release capsule      • ZETIA 10 MG Tab      • finasteride (PROSCAR) 5 MG Tab      • simvastatin (ZOCOR) 40 MG Tab      • tadalafil (CIALIS) 5 MG tablet Take 1 Tab  "by mouth as needed. 90 Tab 3   • gabapentin (NEURONTIN) 300 MG Cap      • Diclofenac Sodium 1 % GEL Apply 1 Application to skin as directed 2 Times a Day. 1 Tube 2   • metformin (GLUCOPHAGE) 500 MG TABS TAKE 1 TABLET BY MOUTH EVERY MORNING 90 Tab 0   • aspirin EC (ECOTRIN) 81 MG TBEC Take 81 mg by mouth every day.     • docosahexanoic acid (OMEGA 3 FA) 1000 MG CAPS Take 1,000 mg by mouth 2 Times a Day.     • glucosamine Sulfate 500 MG CAPS Take 500 mg by mouth 2 times a day, with meals.     • cyanocobalamin (VITAMIN B-12) 100 MCG TABS Take 100 mcg by mouth every day.     • cholecalciferol (D-3-5) 5000 UNIT CAPS Take 5,000 Units by mouth every day.     • [DISCONTINUED] valsartan (DIOVAN) 80 MG Tab Take 1 Tab by mouth every day. 30 Tab 3   • [DISCONTINUED] TRULICITY 0.75 MG/0.5ML Solution Pen-injector      • [DISCONTINUED] metoprolol SR (TOPROL XL) 50 MG TABLET SR 24 HR Take 1 Tab by mouth every day. 90 Tab 3     No facility-administered encounter medications on file as of 11/6/2017.      Review of Systems   Cardiovascular: Negative for orthopnea and PND.   Neurological: Negative for dizziness.   Psychiatric/Behavioral: Negative for substance abuse.        Objective:   /80   Pulse 70   Ht 1.575 m (5' 2\")   Wt 108 kg (238 lb)   SpO2 90%   BMI 43.53 kg/m²     Physical Exam  Exam is otherwise unchanged.  Assessment:     1. Essential hypertension, benign  metoprolol SR (TOPROL XL) 25 MG TABLET SR 24 HR    valsartan (DIOVAN) 80 MG Tab    BASIC METABOLIC PANEL   2. Type 2 diabetes mellitus with diabetic nephropathy, without long-term current use of insulin (CMS-[facility])  TRULICITY 0.75 MG/0.5ML Solution Pen-injector     Blood pressure status is better. I renewed his Trulicity so that he will not run out before he can see Dr. Trejo  Medical Decision Making:  Today's Assessment / Status / Plan:     Continue the current cardiovascular regimen.  Continue primary follow up with  Dr. Trejo.   Cardiology follow up in " 3 months and  sooner if needed for any change.   Lab before end of the year to track potassium.  Use of the emergency medical system reviewed.

## 2017-12-05 LAB
BUN SERPL-MCNC: 21 MG/DL (ref 8–27)
BUN/CREAT SERPL: 24 (ref 10–24)
CALCIUM SERPL-MCNC: 9.2 MG/DL (ref 8.6–10.2)
CHLORIDE SERPL-SCNC: 101 MMOL/L (ref 96–106)
CO2 SERPL-SCNC: 25 MMOL/L (ref 18–29)
CREAT SERPL-MCNC: 0.88 MG/DL (ref 0.76–1.27)
GFR SERPLBLD CREATININE-BSD FMLA CKD-EPI: 82 ML/MIN/1.73
GFR SERPLBLD CREATININE-BSD FMLA CKD-EPI: 95 ML/MIN/1.73
GLUCOSE SERPL-MCNC: 106 MG/DL (ref 65–99)
POTASSIUM SERPL-SCNC: 4.6 MMOL/L (ref 3.5–5.2)
SODIUM SERPL-SCNC: 142 MMOL/L (ref 134–144)

## 2018-01-19 ENCOUNTER — OFFICE VISIT (OUTPATIENT)
Dept: CARDIOLOGY | Facility: MEDICAL CENTER | Age: 78
End: 2018-01-19
Payer: MEDICARE

## 2018-01-19 VITALS
WEIGHT: 238 LBS | HEIGHT: 62 IN | OXYGEN SATURATION: 89 % | BODY MASS INDEX: 43.79 KG/M2 | HEART RATE: 84 BPM | DIASTOLIC BLOOD PRESSURE: 70 MMHG | SYSTOLIC BLOOD PRESSURE: 120 MMHG

## 2018-01-19 DIAGNOSIS — E11.21 TYPE 2 DIABETES MELLITUS WITH DIABETIC NEPHROPATHY, WITHOUT LONG-TERM CURRENT USE OF INSULIN (HCC): ICD-10-CM

## 2018-01-19 DIAGNOSIS — I10 ESSENTIAL HYPERTENSION, BENIGN: ICD-10-CM

## 2018-01-19 DIAGNOSIS — I50.32 CHRONIC DIASTOLIC CONGESTIVE HEART FAILURE (HCC): ICD-10-CM

## 2018-01-19 DIAGNOSIS — I25.10 CORONARY ARTERY DISEASE INVOLVING NATIVE CORONARY ARTERY OF NATIVE HEART WITHOUT ANGINA PECTORIS: ICD-10-CM

## 2018-01-19 DIAGNOSIS — E78.2 MIXED HYPERLIPIDEMIA: ICD-10-CM

## 2018-01-19 DIAGNOSIS — G47.33 SLEEP APNEA, OBSTRUCTIVE: ICD-10-CM

## 2018-01-19 DIAGNOSIS — E66.9 OBESITY, CLASS II, BMI 35-39.9: ICD-10-CM

## 2018-01-19 LAB — EKG IMPRESSION: NORMAL

## 2018-01-19 PROCEDURE — 93000 ELECTROCARDIOGRAM COMPLETE: CPT | Performed by: INTERNAL MEDICINE

## 2018-01-19 PROCEDURE — 99215 OFFICE O/P EST HI 40 MIN: CPT | Mod: 25 | Performed by: INTERNAL MEDICINE

## 2018-01-19 RX ORDER — ATORVASTATIN CALCIUM 40 MG/1
40 TABLET, FILM COATED ORAL
Qty: 90 TAB | Refills: 3 | Status: SHIPPED | OUTPATIENT
Start: 2018-01-19

## 2018-01-19 ASSESSMENT — ENCOUNTER SYMPTOMS: BACK PAIN: 1

## 2018-01-19 NOTE — LETTER
Francis Barbour MD  [facility], [address]  [address], [place] [address]  [place] [address]  Phone: [phone]  Fax: [phone]                Dear Vahid Trejo M.D.,    I had the pleasure of seeing your patient Kendrick Schilling ( - 1940) today in cardiology clinic at the [facility].  As you may recall, he is a pleasant 77 y.o. man whose current medical problems include asymptomatic coronary atherosclerosis, HFpEF, hypertension, dyslipidemia, non-insulin dependent diabetes, and BREANN on CPAP.  I have stopped his metoprolol due to symptomatic erectile dysfunction, and if his BP rises above 130/85, I would start spironolactone as an agent for him given his diastolic heart failure.  I have also switched his simvastatin to atorvastatin for improved primary prevention of cardiovascular disease.  I will see him back in 4 months.  Please let me know if you have any questions, and I appreciate the chance to provide your patients with excellent cardiovascular care.     Best,  Francis Barbour MD  [facility] Cardiology

## 2018-01-19 NOTE — PATIENT INSTRUCTIONS
Please stop metoprolol, please let me know if you blood pressure rises above 130/85.    Please stop simvastatin, and please start atorvastatin (also called lipitor) 40mg by mouth daily.

## 2018-01-19 NOTE — PROGRESS NOTES
Cardiology Follow-up Consultation Note    Date of note:    1/19/2018    Primary Care Provider: Vahid Trejo M.D.  Referring Provider: Levy Martinez M.D.    Patient Name: Kendrick Schilling     YOB: 1940  MRN:              [id number]    Chief Complaint: hypertension    History of Present Illness: Kendrick Schilling is a 77 y.o. male whose current medical problems include asymptomatic coronary atherosclerosis, HFpEF, hypertension, dyslipidemia, non-insulin dependent diabetes, and BREANN on CPAP who is here for follow-up    He was last seen by Dr. Martinez on 11/6/2017.    Interim Events:  Plays [employer] 5 days a week.     In terms of his Bp, he does check at home and it has been in the 110s-120s.  No further hypotension.    In terms of his dyslipidemia, he has been on lipitor in the past, but this was long ago, he does not know why he was switched to simvastatin.     In terms of his heart failure, he has had stable weights, mild LE edema.      Patient denies chest pain, palpitations, dyspnea on exertion, pre-syncope, syncope, orthopnea, PND or recent weight gain.       Review of Systems   Constitution: Positive for malaise/fatigue.   Cardiovascular: Positive for leg swelling.   Musculoskeletal: Positive for back pain.       All other systems reviewed and discussed using a comprehensive questionnaire and are negative.       Past Medical History:   Diagnosis Date   • Arthritis of right hip    • DM (diabetes mellitus), type 2, uncontrolled (CMS-HCC) 2/16/2010   • Hyperlipidemia 2/16/2010   • Hypertension    • Sleep apnea, obstructive 2/11/2016    Uses CPAP and is regularly followed by Dr. Palmer; unable to estimate right ventricular systolic pressure on echo 2016 because of absent tricuspid regurgitation but no indirect evidence of pulmonary hypertension   • Vascular calcification 2/11/2016    Noted incidentally on orthopedic plane films of the hips          Past Surgical History:   Procedure  Laterality Date   • CATARACT EXTRACTION WITH IOL Bilateral    • KNEE REPLACEMENT, TOTAL Bilateral    • TONSILLECTOMY     • UROLOGY SURGERY      cystocele repair         Current Outpatient Prescriptions   Medication Sig Dispense Refill   • metoprolol SR (TOPROL XL) 25 MG TABLET SR 24 HR Take 1 Tab by mouth every day. 90 Tab 3   • valsartan (DIOVAN) 80 MG Tab Take 1 Tab by mouth every day. 90 Tab 3   • Cinnamon 500 MG Cap Take  by mouth.     • Calcium Polycarbophil (FIBER-CAPS PO) Take  by mouth.     • furosemide (LASIX) 20 MG Tab Take 1 Tab by mouth every day. 90 Tab 3   • omeprazole (PRILOSEC) 20 MG delayed-release capsule      • ZETIA 10 MG Tab      • simvastatin (ZOCOR) 40 MG Tab      • tadalafil (CIALIS) 5 MG tablet Take 1 Tab by mouth as needed. 90 Tab 3   • gabapentin (NEURONTIN) 300 MG Cap      • Diclofenac Sodium 1 % GEL Apply 1 Application to skin as directed 2 Times a Day. (Patient taking differently: Apply 1 Application to skin as directed as needed.) 1 Tube 2   • metformin (GLUCOPHAGE) 500 MG TABS TAKE 1 TABLET BY MOUTH EVERY MORNING 90 Tab 0   • aspirin EC (ECOTRIN) 81 MG TBEC Take 81 mg by mouth every day.     • docosahexanoic acid (OMEGA 3 FA) 1000 MG CAPS Take 1,000 mg by mouth 2 Times a Day.     • glucosamine Sulfate 500 MG CAPS Take 500 mg by mouth 2 times a day, with meals.     • cyanocobalamin (VITAMIN B-12) 100 MCG TABS Take 100 mcg by mouth every day.     • cholecalciferol (D-3-5) 5000 UNIT CAPS Take 5,000 Units by mouth every day.     • TRULICITY 0.75 MG/0.5ML Solution Pen-injector Inject 0.75 mg as instructed every 7 days. (Patient not taking: Reported on 1/19/2018) 4 PEN 3   • finasteride (PROSCAR) 5 MG Tab        No current facility-administered medications for this visit.          Allergies   Allergen Reactions   • Vicodin [Hydrocodone-Acetaminophen]          Family History   Problem Relation Age of Onset   • Dementia Mother 79   • Heart Disease Father    • Heart Attack Father 69   • Cancer  "Brother 67     lymphoma   • Dementia Brother          Social History     Social History   • Marital status:      Spouse name: N/A   • Number of children: N/A   • Years of education: N/A     Occupational History   • Not on file.     Social History Main Topics   • Smoking status: Never Smoker   • Smokeless tobacco: Never Used   • Alcohol use 7.0 oz/week     14 Glasses of wine per week   • Drug use: No   • Sexual activity: Yes     Partners: Female     Other Topics Concern   • Not on file     Social History Narrative   • No narrative on file         Physical Exam:  Ambulatory Vitals  Blood pressure 120/70, pulse 84, height 1.575 m (5' 2\"), weight 108 kg (238 lb), SpO2 89 %.   Oxygen Therapy:  Pulse Oximetry: 89 %  BP Readings from Last 4 Encounters:   01/19/18 120/70   11/06/17 120/80   10/09/17 100/60   09/14/17 (!) 96/60       Weight/BMI: Body mass index is 43.53 kg/m².  Wt Readings from Last 4 Encounters:   01/19/18 108 kg (238 lb)   11/06/17 108 kg (238 lb)   10/09/17 107 kg (236 lb)   09/14/17 108 kg (238 lb)       General: Well appearing and in no apparent distress  Eyes: nl conjunctiva  ENT: OP clear  Neck: JVP 5-6 cm H2O, no carotid bruits  Lungs: normal respiratory effort, CTAB  Heart: RRR, no murmurs, no rubs or gallops, 1+ edema bilateral lower extremities. No LV/RV heave on cardiac palpatation. 2+ bilateral radial pulses.  2+ bilateral dp pulses.   Abdomen: soft, non tender, non distended, no masses, normal bowel sounds.  No HSM.  Extremities/MSK: no clubbing, no cyanosis  Neurological: No focal sensory deficits  Psychiatric: Appropriate affect, A/O x 3  Skin: Warm extremities      Lab Data Review:  Lab Results   Component Value Date/Time    CHOLSTRLTOT 154 09/11/2017 12:25 PM    CHOLSTRLTOT 153 06/15/2012 08:57 AM    LDL 68 09/11/2017 12:25 PM    LDL 71 06/15/2012 08:57 AM    HDL 69 09/11/2017 12:25 PM    HDL 63 06/15/2012 08:57 AM    TRIGLYCERIDE 85 09/11/2017 12:25 PM    TRIGLYCERIDE 95 06/15/2012 " 08:57 AM       Lab Results   Component Value Date/Time    SODIUM 142 12/04/2017 12:49 PM    SODIUM 139 06/15/2012 08:57 AM    POTASSIUM 4.6 12/04/2017 12:49 PM    POTASSIUM 4.2 06/15/2012 08:57 AM    CHLORIDE 101 12/04/2017 12:49 PM    CHLORIDE 103 06/15/2012 08:57 AM    CO2 25 12/04/2017 12:49 PM    CO2 22 06/15/2012 08:57 AM    GLUCOSE 106 (H) 12/04/2017 12:49 PM    GLUCOSE 108 (H) 06/15/2012 08:57 AM    BUN 21 12/04/2017 12:49 PM    BUN 22 06/15/2012 08:57 AM    CREATININE 0.88 12/04/2017 12:49 PM    CREATININE 0.87 06/15/2012 08:57 AM    BUNCREATRAT 24 12/04/2017 12:49 PM    BUNCREATRAT 25 (H) 06/15/2012 08:57 AM    GLOMRATE >59 01/14/2011 09:20 AM     CrCl cannot be calculated (Patient's most recent lab result is older than the maximum 7 days allowed.).  Lab Results   Component Value Date/Time    ALKPHOSPHAT 70 09/11/2017 12:25 PM    ALKPHOSPHAT 62 06/15/2012 08:57 AM    ASTSGOT 22 09/11/2017 12:25 PM    ASTSGOT 31 06/15/2012 08:57 AM    ALTSGPT 18 09/11/2017 12:25 PM    ALTSGPT 40 06/15/2012 08:57 AM    TBILIRUBIN 0.4 09/11/2017 12:25 PM    TBILIRUBIN 0.6 06/15/2012 08:57 AM      Lab Results   Component Value Date/Time    WBC 7.4 03/06/2017 09:04 AM    WBC 7.0 06/15/2012 08:57 AM     Lab Results   Component Value Date/Time    HBA1C 6.2% 09/19/2014 11:16 AM     No components found for: TROP      Cardiac Imaging and Procedures Review:    EKG dated 1/19/18 : My personal interpretation is NSR, 1st degree AV block, borderline t wave abnormalities inferior and lateral leads    Echo dated 2/2016:   Normal left ventricular size and systolic function.  Mild concentric left ventricular hypertrophy.  Grade I diastolic dysfunction.  The left atrium is upper normal in size.  Mild aortic sclerosis without significant stenosis or regurgitation.  Mildly dilated right ventricle with systolic function.  Unable to estimate pulmonary artery pressure due to an inadequate   tricuspid regurgitant jet.  Borderline dilated right  atrium.    Nuclear Perfusion Imaging (2016):   Myocardial Perfusion   Report   IMPRESSIONS   Normal left ventricular size, ejection fraction, and wall motion.   No evidence of  prior myocardial infarction.   Cannot exclude a small segment of mild basal inferior ischemia but also    significant attenuation.    Radiology test Review:  Chest Ct:  1. PROMINENT ANDRE ON COMPARISON RADIOGRAPH ARE SECONDARY TO ENLARGED   CENTRAL PULMONARY ARTERIES MOST CONSISTENT WITH PULMONARY ARTERIAL   HYPERTENSION, ALTHOUGH THE DILATATION IS NOT SEVERE.    2. MODERATE-TO-SEVERE LEFT ANTERIOR DESCENDING GREATER THAN OTHER   CORONARY ARTERY DISEASE.     Medical Decision Makin. Chronic diastolic congestive heart failure (CMS-HCC)  Stable weights at 238 pounds  -continue lasix 20mg PO daily  -consider spironolactone if ever needs anti-hypertensive agent    2. Obesity, Class II, BMI 35-39.9  Will discuss weight loss importance next visit    3. Type 2 diabetes mellitus with diabetic nephropathy, without long-term current use of insulin (CMS-[facility])  Per PCP    4. Sleep apnea, obstructive  Cont CPAP    5. Mixed hyperlipidemia  Has asymptomatic atherosclerosis, will try to get to maximum tolerated statin  -stop simvastatin, start lipitor 40mg PO Daily    6. Essential hypertension, benign  Well controlled  -goal SBP <130, will stop metoprolol given this is likely not helping blood pressure much and is causing erectile dysfunction. This improved decreasing the dose to 25mg PO daily, and will likely improve further with stopping the medication.  If his SBP is >130, will start spironolactone.     7. Coronary artery disease involving native coronary artery of native heart without angina pectoris  -aspirin, lipitor      Return in about 4 months (around 2018).      Francis Barbour MD  [facility]  [facility], [address]  [address] [address]  Phone:  [phone]  Fax: [phone]

## 2021-01-15 DIAGNOSIS — Z23 NEED FOR VACCINATION: ICD-10-CM
